# Patient Record
Sex: FEMALE | Race: WHITE | URBAN - METROPOLITAN AREA
[De-identification: names, ages, dates, MRNs, and addresses within clinical notes are randomized per-mention and may not be internally consistent; named-entity substitution may affect disease eponyms.]

---

## 2023-05-15 ENCOUNTER — TELEPHONE (OUTPATIENT)
Dept: HEMATOLOGY/ONCOLOGY | Facility: HOSPITAL | Age: 83
End: 2023-05-15

## 2023-06-07 ENCOUNTER — APPOINTMENT (OUTPATIENT)
Dept: GENERAL RADIOLOGY | Facility: HOSPITAL | Age: 83
End: 2023-06-07
Attending: EMERGENCY MEDICINE
Payer: MEDICARE

## 2023-06-07 ENCOUNTER — HOSPITAL ENCOUNTER (EMERGENCY)
Facility: HOSPITAL | Age: 83
Discharge: HOME | End: 2023-06-08
Attending: EMERGENCY MEDICINE
Payer: MEDICARE

## 2023-06-07 DIAGNOSIS — R41.0 CONFUSION: Primary | ICD-10-CM

## 2023-06-07 LAB
ALBUMIN SERPL-MCNC: 3.3 G/DL (ref 3.4–5)
ALBUMIN/GLOB SERPL: 0.7 {RATIO} (ref 1–2)
ALP LIVER SERPL-CCNC: 52 U/L
ALT SERPL-CCNC: 18 U/L
ANION GAP SERPL CALC-SCNC: 5 MMOL/L (ref 0–18)
AST SERPL-CCNC: 51 U/L (ref 15–37)
BASOPHILS # BLD AUTO: 0.06 X10(3) UL (ref 0–0.2)
BASOPHILS NFR BLD AUTO: 0.8 %
BILIRUB SERPL-MCNC: 0.9 MG/DL (ref 0.1–2)
BUN BLD-MCNC: 13 MG/DL (ref 7–18)
CALCIUM BLD-MCNC: 10 MG/DL (ref 8.5–10.1)
CHLORIDE SERPL-SCNC: 105 MMOL/L (ref 98–112)
CO2 SERPL-SCNC: 25 MMOL/L (ref 21–32)
CREAT BLD-MCNC: 0.88 MG/DL
EOSINOPHIL # BLD AUTO: 0.41 X10(3) UL (ref 0–0.7)
EOSINOPHIL NFR BLD AUTO: 5.6 %
ERYTHROCYTE [DISTWIDTH] IN BLOOD BY AUTOMATED COUNT: 13.2 %
GFR SERPLBLD BASED ON 1.73 SQ M-ARVRAT: 65 ML/MIN/1.73M2 (ref 60–?)
GLOBULIN PLAS-MCNC: 4.5 G/DL (ref 2.8–4.4)
GLUCOSE BLD-MCNC: 131 MG/DL (ref 70–99)
HCT VFR BLD AUTO: 41.4 %
HGB BLD-MCNC: 13.7 G/DL
IMM GRANULOCYTES # BLD AUTO: 0.03 X10(3) UL (ref 0–1)
IMM GRANULOCYTES NFR BLD: 0.4 %
LACTATE SERPL-SCNC: 1.8 MMOL/L (ref 0.4–2)
LYMPHOCYTES # BLD AUTO: 2.09 X10(3) UL (ref 1–4)
LYMPHOCYTES NFR BLD AUTO: 28.3 %
MCH RBC QN AUTO: 28.6 PG (ref 26–34)
MCHC RBC AUTO-ENTMCNC: 33.1 G/DL (ref 31–37)
MCV RBC AUTO: 86.4 FL
MONOCYTES # BLD AUTO: 0.94 X10(3) UL (ref 0.1–1)
MONOCYTES NFR BLD AUTO: 12.7 %
NEUTROPHILS # BLD AUTO: 3.85 X10 (3) UL (ref 1.5–7.7)
NEUTROPHILS # BLD AUTO: 3.85 X10(3) UL (ref 1.5–7.7)
NEUTROPHILS NFR BLD AUTO: 52.2 %
OSMOLALITY SERPL CALC.SUM OF ELEC: 282 MOSM/KG (ref 275–295)
PHOSPHATE SERPL-MCNC: 2.3 MG/DL (ref 2.5–4.9)
PLATELET # BLD AUTO: 224 10(3)UL (ref 150–450)
POTASSIUM SERPL-SCNC: 4.9 MMOL/L (ref 3.5–5.1)
PROT SERPL-MCNC: 7.8 G/DL (ref 6.4–8.2)
RBC # BLD AUTO: 4.79 X10(6)UL
SODIUM SERPL-SCNC: 135 MMOL/L (ref 136–145)
TROPONIN I HIGH SENSITIVITY: 10 NG/L
WBC # BLD AUTO: 7.4 X10(3) UL (ref 4–11)

## 2023-06-07 PROCEDURE — 71045 X-RAY EXAM CHEST 1 VIEW: CPT | Performed by: EMERGENCY MEDICINE

## 2023-06-07 PROCEDURE — 99285 EMERGENCY DEPT VISIT HI MDM: CPT

## 2023-06-07 PROCEDURE — 80053 COMPREHEN METABOLIC PANEL: CPT | Performed by: EMERGENCY MEDICINE

## 2023-06-07 PROCEDURE — 96360 HYDRATION IV INFUSION INIT: CPT

## 2023-06-07 PROCEDURE — 83605 ASSAY OF LACTIC ACID: CPT | Performed by: EMERGENCY MEDICINE

## 2023-06-07 PROCEDURE — 80053 COMPREHEN METABOLIC PANEL: CPT

## 2023-06-07 PROCEDURE — 84100 ASSAY OF PHOSPHORUS: CPT | Performed by: EMERGENCY MEDICINE

## 2023-06-07 PROCEDURE — 84484 ASSAY OF TROPONIN QUANT: CPT | Performed by: EMERGENCY MEDICINE

## 2023-06-07 PROCEDURE — 93005 ELECTROCARDIOGRAM TRACING: CPT

## 2023-06-07 PROCEDURE — 84100 ASSAY OF PHOSPHORUS: CPT

## 2023-06-07 PROCEDURE — 85025 COMPLETE CBC W/AUTO DIFF WBC: CPT

## 2023-06-07 PROCEDURE — 85025 COMPLETE CBC W/AUTO DIFF WBC: CPT | Performed by: EMERGENCY MEDICINE

## 2023-06-07 PROCEDURE — 93010 ELECTROCARDIOGRAM REPORT: CPT

## 2023-06-07 PROCEDURE — 96361 HYDRATE IV INFUSION ADD-ON: CPT

## 2023-06-07 RX ORDER — BUSPIRONE HYDROCHLORIDE 15 MG/1
15 TABLET ORAL 2 TIMES DAILY
COMMUNITY

## 2023-06-07 RX ORDER — LEVOTHYROXINE SODIUM 0.07 MG/1
75 TABLET ORAL
COMMUNITY

## 2023-06-07 RX ORDER — SODIUM CHLORIDE 9 MG/ML
125 INJECTION, SOLUTION INTRAVENOUS CONTINUOUS
Status: DISCONTINUED | OUTPATIENT
Start: 2023-06-07 | End: 2023-06-08

## 2023-06-07 RX ORDER — PAROXETINE 30 MG/1
30 TABLET, FILM COATED ORAL EVERY MORNING
COMMUNITY

## 2023-06-07 RX ORDER — ANASTROZOLE 1 MG/1
1 TABLET ORAL DAILY
COMMUNITY

## 2023-06-08 ENCOUNTER — APPOINTMENT (OUTPATIENT)
Dept: CT IMAGING | Facility: HOSPITAL | Age: 83
End: 2023-06-08
Attending: EMERGENCY MEDICINE
Payer: MEDICARE

## 2023-06-08 VITALS
SYSTOLIC BLOOD PRESSURE: 148 MMHG | RESPIRATION RATE: 20 BRPM | OXYGEN SATURATION: 98 % | HEART RATE: 95 BPM | DIASTOLIC BLOOD PRESSURE: 87 MMHG | TEMPERATURE: 98 F

## 2023-06-08 LAB
ATRIAL RATE: 85 BPM
BILIRUB UR QL STRIP.AUTO: NEGATIVE
COLOR UR AUTO: YELLOW
GLUCOSE UR STRIP.AUTO-MCNC: NEGATIVE MG/DL
HYALINE CASTS #/AREA URNS AUTO: PRESENT /LPF
NITRITE UR QL STRIP.AUTO: NEGATIVE
P AXIS: 71 DEGREES
P-R INTERVAL: 170 MS
PH UR STRIP.AUTO: 6 [PH] (ref 5–8)
PROT UR STRIP.AUTO-MCNC: NEGATIVE MG/DL
Q-T INTERVAL: 364 MS
QRS DURATION: 86 MS
QTC CALCULATION (BEZET): 433 MS
R AXIS: -40 DEGREES
RBC UR QL AUTO: NEGATIVE
SP GR UR STRIP.AUTO: 1.02 (ref 1–1.03)
T AXIS: 78 DEGREES
UROBILINOGEN UR STRIP.AUTO-MCNC: 2 MG/DL
VENTRICULAR RATE: 85 BPM

## 2023-06-08 PROCEDURE — 87186 SC STD MICRODIL/AGAR DIL: CPT | Performed by: EMERGENCY MEDICINE

## 2023-06-08 PROCEDURE — 81001 URINALYSIS AUTO W/SCOPE: CPT | Performed by: EMERGENCY MEDICINE

## 2023-06-08 PROCEDURE — 70450 CT HEAD/BRAIN W/O DYE: CPT | Performed by: EMERGENCY MEDICINE

## 2023-06-08 PROCEDURE — 87086 URINE CULTURE/COLONY COUNT: CPT | Performed by: EMERGENCY MEDICINE

## 2023-06-08 PROCEDURE — 87077 CULTURE AEROBIC IDENTIFY: CPT | Performed by: EMERGENCY MEDICINE

## 2023-06-08 NOTE — ED QUICK NOTES
Assumed care of pt from [facility]. Pt resting on cart, respirations even/NL. Denies needs at this time, call light within reach.

## 2023-06-10 ENCOUNTER — TELEPHONE (OUTPATIENT)
Dept: CASE MANAGEMENT | Facility: HOSPITAL | Age: 83
End: 2023-06-10

## 2023-06-10 RX ORDER — AMOXICILLIN 875 MG/1
875 TABLET, COATED ORAL 2 TIMES DAILY
Qty: 10 TABLET | Refills: 0 | Status: SHIPPED | OUTPATIENT
Start: 2023-06-10 | End: 2023-06-15

## 2023-06-10 NOTE — CM/SW NOTE
Received a call from pt's son Cory krause. They saw the positive urine culture and mother is confused. They would like antibiotic prescribed. Spoke to Payton in pharmacy. Med needs to be called into [facility] at [address] [facility] [facility] [address]  Phone in [phone] as they are open until 15 [place]    Once prescription is filled call dtr. Minoo Lara at [phone].

## 2023-06-10 NOTE — PROGRESS NOTES
New rx for amoxicillin sent to [facility] [address] in [place]. Patient informed and educated regarding change to therapy.

## 2023-06-14 ENCOUNTER — APPOINTMENT (OUTPATIENT)
Dept: HEMATOLOGY/ONCOLOGY | Facility: HOSPITAL | Age: 83
End: 2023-06-14
Attending: INTERNAL MEDICINE

## 2023-06-15 ENCOUNTER — HOSPITAL ENCOUNTER (EMERGENCY)
Facility: HOSPITAL | Age: 83
Discharge: NURSING, REHAB OR LONG-TERM CARE | End: 2023-06-16
Attending: STUDENT IN AN ORGANIZED HEALTH CARE EDUCATION/TRAINING PROGRAM
Payer: MEDICARE

## 2023-06-15 DIAGNOSIS — N30.00 ACUTE CYSTITIS WITHOUT HEMATURIA: Primary | ICD-10-CM

## 2023-06-15 DIAGNOSIS — F03.93 DEMENTIA WITH MOOD DISTURBANCE, UNSPECIFIED DEMENTIA SEVERITY, UNSPECIFIED DEMENTIA TYPE (HCC): ICD-10-CM

## 2023-06-15 LAB
BILIRUB UR QL STRIP.AUTO: NEGATIVE
COLOR UR AUTO: YELLOW
GLUCOSE UR STRIP.AUTO-MCNC: NEGATIVE MG/DL
HYALINE CASTS #/AREA URNS AUTO: PRESENT /LPF
NITRITE UR QL STRIP.AUTO: NEGATIVE
PH UR STRIP.AUTO: 8 [PH] (ref 5–8)
PROT UR STRIP.AUTO-MCNC: NEGATIVE MG/DL
RBC #/AREA URNS AUTO: >10 /HPF
SARS-COV-2 RNA RESP QL NAA+PROBE: NOT DETECTED
SP GR UR STRIP.AUTO: 1.02 (ref 1–1.03)
UROBILINOGEN UR STRIP.AUTO-MCNC: 2 MG/DL

## 2023-06-15 RX ORDER — MULTIVIT-MIN/IRON FUM/FOLIC AC 7.5 MG-4
1 TABLET ORAL DAILY
COMMUNITY

## 2023-06-16 ENCOUNTER — APPOINTMENT (OUTPATIENT)
Dept: GENERAL RADIOLOGY | Facility: HOSPITAL | Age: 83
End: 2023-06-16
Attending: STUDENT IN AN ORGANIZED HEALTH CARE EDUCATION/TRAINING PROGRAM
Payer: MEDICARE

## 2023-06-16 VITALS
BODY MASS INDEX: 24.45 KG/M2 | DIASTOLIC BLOOD PRESSURE: 81 MMHG | HEART RATE: 89 BPM | TEMPERATURE: 98 F | WEIGHT: 145 LBS | SYSTOLIC BLOOD PRESSURE: 147 MMHG | RESPIRATION RATE: 18 BRPM | OXYGEN SATURATION: 96 % | HEIGHT: 64.57 IN

## 2023-06-16 LAB
ALBUMIN SERPL-MCNC: 3.4 G/DL (ref 3.4–5)
ALBUMIN/GLOB SERPL: 0.7 {RATIO} (ref 1–2)
ALP LIVER SERPL-CCNC: 57 U/L
ALT SERPL-CCNC: 29 U/L
AMPHET UR QL SCN: NEGATIVE
ANION GAP SERPL CALC-SCNC: 7 MMOL/L (ref 0–18)
APAP SERPL-MCNC: <2 UG/ML (ref 10–30)
AST SERPL-CCNC: 40 U/L (ref 15–37)
BENZODIAZ UR QL SCN: NEGATIVE
BILIRUB SERPL-MCNC: 0.7 MG/DL (ref 0.1–2)
BUN BLD-MCNC: 13 MG/DL (ref 7–18)
CALCIUM BLD-MCNC: 9.6 MG/DL (ref 8.5–10.1)
CANNABINOIDS UR QL SCN: NEGATIVE
CHLORIDE SERPL-SCNC: 102 MMOL/L (ref 98–112)
CO2 SERPL-SCNC: 26 MMOL/L (ref 21–32)
COCAINE UR QL: NEGATIVE
CREAT BLD-MCNC: 0.94 MG/DL
CREAT UR-SCNC: 43.1 MG/DL
ETHANOL SERPL-MCNC: <3 MG/DL (ref ?–3)
GFR SERPLBLD BASED ON 1.73 SQ M-ARVRAT: 60 ML/MIN/1.73M2 (ref 60–?)
GLOBULIN PLAS-MCNC: 5.1 G/DL (ref 2.8–4.4)
GLUCOSE BLD-MCNC: 116 MG/DL (ref 70–99)
MDMA UR QL SCN: NEGATIVE
OPIATES UR QL SCN: NEGATIVE
OSMOLALITY SERPL CALC.SUM OF ELEC: 281 MOSM/KG (ref 275–295)
OXYCODONE UR QL SCN: NEGATIVE
POTASSIUM SERPL-SCNC: 4 MMOL/L (ref 3.5–5.1)
PROT SERPL-MCNC: 8.5 G/DL (ref 6.4–8.2)
SALICYLATES SERPL-MCNC: <1.7 MG/DL (ref 2.8–20)
SODIUM SERPL-SCNC: 135 MMOL/L (ref 136–145)

## 2023-06-16 PROCEDURE — 71046 X-RAY EXAM CHEST 2 VIEWS: CPT | Performed by: STUDENT IN AN ORGANIZED HEALTH CARE EDUCATION/TRAINING PROGRAM

## 2023-06-16 PROCEDURE — 90792 PSYCH DIAG EVAL W/MED SRVCS: CPT

## 2023-06-16 RX ORDER — CEPHALEXIN 500 MG/1
500 CAPSULE ORAL ONCE
Status: COMPLETED | OUTPATIENT
Start: 2023-06-16 | End: 2023-06-16

## 2023-06-16 RX ORDER — DIPHENHYDRAMINE HYDROCHLORIDE 50 MG/ML
25 INJECTION INTRAMUSCULAR; INTRAVENOUS ONCE
Status: COMPLETED | OUTPATIENT
Start: 2023-06-16 | End: 2023-06-16

## 2023-06-16 RX ORDER — ANASTROZOLE 1 MG/1
1 TABLET ORAL DAILY
Status: DISCONTINUED | OUTPATIENT
Start: 2023-06-16 | End: 2023-06-16

## 2023-06-16 RX ORDER — CEPHALEXIN 500 MG/1
500 CAPSULE ORAL 3 TIMES DAILY
Qty: 21 CAPSULE | Refills: 0 | Status: SHIPPED | OUTPATIENT
Start: 2023-06-16 | End: 2023-06-23

## 2023-06-16 RX ORDER — BUSPIRONE HYDROCHLORIDE 10 MG/1
15 TABLET ORAL 2 TIMES DAILY
Status: DISCONTINUED | OUTPATIENT
Start: 2023-06-16 | End: 2023-06-16

## 2023-06-16 RX ORDER — HALOPERIDOL 5 MG/ML
2 INJECTION INTRAMUSCULAR ONCE
Status: COMPLETED | OUTPATIENT
Start: 2023-06-16 | End: 2023-06-16

## 2023-06-16 NOTE — ED NOTES
Delayed Note:    [address] and left a voice mail message    [facility] [place] Baldomero at [phone] to make referral to [facility]. Was unable to reach a staff member. Lukas Lind    Called [facility] and spoke with Gisel Nam who confirmed that there were two female beds. Gisel Nam suggested to call Gosia Corona at [phone]. Called Gosia Corona at [phone] and referral was made over the phone. Referral Packet was faxed for review.

## 2023-06-16 NOTE — ED QUICK NOTES
Report given to [address] [place] [address] from [facility]. They will set up transport to take pt after 1900.

## 2023-06-16 NOTE — ED QUICK NOTES
Rounding Completed    Plan of Care reviewed. Waiting for placement. Elimination needs assessed. Bed is locked and in lowest position. Call light within reach. Sitter at bedside.

## 2023-06-16 NOTE — ED PROVIDER NOTES
The patient was observed in the department and remained calm and cooperative during their stay. We are awaiting psychiatric placement at this time. The patient's been medically cleared at this time for psychiatric transfer.

## 2023-06-16 NOTE — ED QUICK NOTES
Pt got out of bed, pulled off depends. Pt redirected to get in bed. Pt's linen was changed and purewick reapplied.

## 2023-06-16 NOTE — ED NOTES
Received a call back from Tiffany at [facility]. Referral Packet faxed for review as well.

## 2023-06-16 NOTE — ED NOTES
Delayed Note:    Spoke with Kasie Harmon to inform her that [facility] were reviewing for placement. Kasie Harmon stated that she was looking for her son, Ced Blancas. Informed Priscilla that we will contact Ced Blancas to inform him on plan of care as well. Kasie Harmon provided permission to contact her son, who is also her POA. Called Ced Avilezaron, Priscilla's POA, to provide update on plan of care. Informed Ced Blancas that there are currently no beds at [facility]. Informed Ced Avilezaron that [facility] are reviewing for placement. Provided Ced Blancas the phone number to RN station so that he is able to speak with Kasie Harmon and to ask medication questions. Informed Ced Blancas that he will be provided with placement status from both hospitals. Received a call from Chad Cisse (Intake at [facility] who stated that [facility] is reviewing for placement. Chad Cisse did not receive the Petition from [facility] Re-faxed [facility] so that it could be forwarded to [facility].

## 2023-06-16 NOTE — ED INITIAL ASSESSMENT (HPI)
Pt's son brought pt for increased confusion and verbally aggressive. Pt lives at independent living and pt's son states concern that the pt is not safe where she lives and made a threat to harm herself tonight. Pt's son reports the pt stated \" If I had a gun I would shoot myself. I don't want to be here anymore\". Pt was seen in the ER last week and was given abx for UTI.

## 2023-06-16 NOTE — ED QUICK NOTES
Pt ambulated to bathroom with steady gait with this RN and sitter. Pt urinated successfully. 400 mL output.

## 2023-06-16 NOTE — BH LEVEL OF CARE ASSESSMENT
Crisis Evaluation Assessment    Bao Alvarez YOB: 1940   Age 79year old MRN [id number]   Location [facility] Attending Salma Woods MD      Patient's legal sex: female  Patient identifies as: female  Patient's birth sex: female  Preferred pronouns: She/Her    Date of Service: 6/16/2023    Referral Source:  Referral Source  Referral Source: Friend/Relative  Referral Source Info: Pt family brought pt in for mental health assessment     Reason for Crisis Evaluation   Pt family reports pt has been having increased agitation, confusion, verbal outburst, delusional thoughts, and paranoia. Collateral  Pt family reports pt has been experiencing increased agitation and verbal aggression. Pt family reports pt just moved to [place] 2 weeks ago and they are seeing the behavioral health concerns in person now and feel pt is not safe. Pt family report they originally got pt into independent living however learned very quickly that she should be in assisted living. Patient family reports pt has been calling them multiple times overnight with paranoid and delusional thoughts. Pt family reports pt is fine mid-day, but overnight she 'sun-downs' and becomes increasingly worse. Pt family reports they are concerned for pt's safety. Pt family report pt has dementia and is confused often. Pt family report pt made a suicidal statement today, reporting if she had a gun she would shoot herself. Pt family reports there are no guns in pt's possession. Pt family report when pt was living with another relative in another state, that relative informed them of pt's behavioral health however pt family didn't realize it was a bad as it was until she moved to [place]. Pt family report pt is becoming quickly aggressive towards them and will push them away.      Risk to Self or Others  Psychosis: Pt family report pt experiences delusional thoughts overnight and calls them multiple times making distress calls, requiring reassurance she is fine. Aggression/Agitation: Pt family report increased agitation and escalating verbal aggression. Destruction of property: Pt family denies  HI: Pt family denies    Suicide Risk Assessments:    Source of information for CSSR: Patient  In what setting is the screener performed?: in person  1. Have you wished you were dead or wished you could go to sleep and not wake up? (past 30 days): Yes  2. Have you actually had any thoughts of killing yourself? (past 30 days): Yes  3. Have you been thinking about how you might kill yourself? (past 30 days): Yes  4. Have you had these thoughts and had some intention of acting on them? (past 30 days): No  5a. Have you started to work out or worked out the details of how to kill yourself? (past 30 days): No  5b. Do you intend to carry out this plan? (past 30 days): No  6. Have you ever done anything, started to do anything, or prepared to do anything to end your life? (lifetime): No     Score - BH OV: 4 - Medium Risk   Describe : Pt family report pt making SI statements, today she said she wanted to shoot herself with a gun  Is your experience of thoughts of dying by suicide: Frightening  Protective Factors: Family  Past Suicidal Ideation: Ideation  Describe: Pt making suicidal statements, no methods until today         Family History or Personal Lived Experience of Loss or Near Loss by Suicide: Denies          Helpless/Hopeless/Worthlessness: Pt family reports when pt comes out of behavioral outburst, she apologizes and says that is not like her to do and is remorseful. Significant losses: Pt lost her  5 years ago. Stressors:  [place], in independent living, alone, social anxiety, HARVEY, general worry, dementia    Non-Suicidal Self-Injury:   Pt family denies    Access to Means:  Access to Means  Has access to means to attempt suicide or harm others or property: No  Access to Firearm/Weapon: No  Do you have a firearm owner ID card?: No    Protective Factors:   Protective Factors: Family    Review of Psychiatric Systems:  Anxiety: Pt family reports pt has been experiencing increased anxiety for 1 hour at a time, multiple times a day, to the point where she calls them overnight multiple times. Pt family report pt is also fidgety and very agitated. Depression: Pt family reports pt experiences outbursts of depression, recent SI statements. Pt family reports this behavior heightened after pt was told she wont be staying in [place]. Sleep: Pt family reports pt has trouble sleeping overnight and calls at 1 AM, 3 AM, and more times due to panic, confusion, seeming like manic phone calls, rests during day but is easily awoken. Pt family reports they do not think pt has had full nights rest in awhile. Appetite: Pt family reports appetite is minimal, up and down, depends on the meal    Substance Use:  Pt family denies    Functional Achievement:   Work:Pt family report pt is retired  Home: See james assessment for ADL's    Current Treatment and Treatment History:  Prior diagnoses: HARVEY in 2019, agoraphobia late 's, early 's. Probably MDD  Therapist: 2x only, Annemarie  Psychiatrist: Need to see one, no visits available until July  Medications: Paroxetine 30 mg, Buspirone 15 mg 2x daily, levothyroxine 75 mcg 2x daily, anastrozole 1 mg  No IP/OP hx    Relevant Social History:    11 years ago, 3 children, 2 are POA's (1 financial, 1 medical)  Independent living, family wants assisted now. [place] with daughter from [place]  No legal hx  No abuse/trauma  HARVEY, OCD in family hx    James and Complex (as applicable):  Geriatric Functioning  Dementia Symptoms Observed/Expressed:  Yes  Aberrant Motor Activity: Other (comment) (Forgetting to put pants on at times)  Verbal Abuse to Others: Yes (Describe)  Potentially Dangerous Behaviors: Agitation;Combative with/refusing care  Noisy Vocalizations: None  Frequent Distress Calls: Yes (Describe)  Describe Frequent Distress Calls[de-identified] Overnight will call, but today began calling numerous times in distress  Responsiveness to Reassurance: Yes  Current Living Situation  Current Living Situation:  (Independent living facility)  [facility]  Is the patient verbal?: Yes  Vision or hearing correction?: Eye Glasses  Patient able to understand and follow directions?: Yes  Patient able to express needs?: Yes  Feeding and Swallowing  History of aspiration or choking?: No  Feeding assistance needed?: Requires set-up  Patient have any chewing or swallowing problems?: No  Special Diet: No  Mobility/Activity & Assistive Devices  Current/recent injuries or surgeries that affect mobility?: No  Physical Limitations Present: None  Independent in ambulation?: Yes  Transfer Assist: Standby Assist  Assistive Device Used[de-identified] Walker  History of falls?: No  Grooming  Level of independence in dressing: Minimal assist  Level of independence to shower/bathe/wash: Maximum assist  Level of independence in personal grooming tasks (e.g., brushing teeth, brushing hair, applying hearing aids, shaving, etc.): Requires set-up/cues  Toileting  Patient Incontinence: Bladder; Bowel (bowel: needs help with wiping)  Special Considerations  Patient has pressures sores, surgical wounds, bandages/dressings?: No  Patient uses any kind of pump?: No  Does the patient need a BiPAP or CPAP?: No  Intellectual disability reported?   Intellectual Disability?: No  Reported Social/Emotional Functioning Level  Describe: Age appropriate    EDP Assessment (as applicable):  IBW Calculations  Weight: 145 lb  BMI (Calculated): 24.5  IBW LBS [place]: 122.84 LBS  IBW %: 118.04 %  IBW + 10%: 135.12 LBS  IBW - 10%: 110.56 LBS    Abuse Assessment:  Abuse Assessment  Physical Abuse: Denies  Verbal Abuse: Denies  Sexual Abuse: Denies  Neglect: Denies  Does anyone say or do something to you that makes you feel unsafe?: No  Have You Ever Been Harmed by a Partner/Caregiver?: No  Health Concerns r/t Abuse: No  Possible Abuse Reportable to[de-identified] Not appropriate for reporting to authorities    Mental Status Exam:   General Appearance  Characteristics: Appropriate clothing;Good hygiene  Eye Contact: Direct  Psychomotor Behavior  Gait/Movement: Normal;Steady; Coordinated  Abnormal movements: None  Posture: Stiff  Rate of Movement: Normal  Mood and Affect  Mood or Feelings: Irritability; Anxious; Confused;Stressed  Anxiety Level- MARIANELA only: Moderate  Appropriateness of Affect: Congruent to mood; Appropriate to situation  Range of Affect: Normal  Stability of Affect: Stable  Attitude toward staff: Co-operative; Suspicious  Speech  Rate of Speech: Appropriate  Flow of Speech: Appropriate  Intensity of Volume: Ordinary  Clarity: Clear  Cognition  Concentration: Unimpaired  Memory: Recent memory intact; Remote memory intact  Orientation Level: Oriented X4;Appropriate for developmental age  Insight: Poor  Fair/poor insight as evidenced by: Pt experiencing increased behavioral aggressive and delusional outburst  Judgment: Poor  Fair/poor judgment as evidenced by: Pt experiencing increased behavioral aggressive and delusional outburst  Thought Patterns  Clarity/Relevance: Coherent;Logical;Relevant to topic  Flow: Organized  Content: Delusions; Loose association  Level of Consciousness: Alert  Level of Consciousness: Alert  Behavior  Exhibited behavior: Participated      Disposition:  Consulted with Dr. Hilary Reese, pt's ED physician. Agreement for inpatient hospitalization for pt due to inability to care for self. Assessment Summary:   Pt is an 71-year-old female presenting with increased agitation, increased behavioral aggressive and delusional outburst.  Pt expressed SI, denies plan or intent. Pt denies HI/AVH/SIB. Pt has 2 POA's, 1 for financial and 1 for medical, both papers scanned into pt's chart.   Pt family reports pt has been experiencing increased agitation and verbal aggression. Pt family reports pt just moved to [place] 2 weeks ago and they are seeing the behavioral health concerns in person now and feel pt is not safe. Pt family report they originally got pt into independent living however learned very quickly that she should be in assisted living. Patient family reports pt has been calling them multiple times overnight with paranoid and delusional thoughts. Pt family reports pt is fine mid-day, but overnight she 'sun-downs' and becomes increasingly worse. Pt family reports they are concerned for pt's safety. Pt family report pt has dementia and is confused often. Pt family report pt made a suicidal statement today, reporting if she had a gun she would shoot herself. Pt family reports there are no guns in pt's possession. Pt family report when pt was living with another relative in another state, that relative informed them of pt's behavioral health however pt family didn't realize it was a bad as it was until she moved to [place]. Pt family report pt is becoming quickly aggressive towards them and will push them away. Pt not sleeping or eating well. Risk/Protective Factors  Protective Factors: Family    Level of Care Recommendations  Consulted with: Dr. Walker Ayala  Level of Care Recommendation: Inpatient Acute Care  Unit: [facility]/Gener[facility]  Reason for Unit Assigned: Pt experiencing increased behavioral aggressive and delusional outburst  Inpatient Criteria: Inability to care for self; Failure at lowest level of care  Behavioral Precautions: Close Observation  Medical Precautions: None  Refused Treatment: No  Education Provided: Call 911 in an Emergency; Advised to call with questions;[facility] Crisis Line Number;Advised to call if condition worsens  Transferred: No  Sign-In  Paperwork Signed: Patient Rights;Voluntary Admission Form  Inpatient Admission Type: Adult Voluntary Signed  Patient Provided: Rights of Individuals Receiving MH/DD Services  Patient Verbalized Understanding: Yes        Diagnoses:  Primary Psychiatric Diagnosis  F03. 91 Unspecified dementia, unspecified severity, with behavioral disturbance     Secondary Psychiatric Diagnoses  To be determined   Pervasive Diagnoses  Deferred  Pertinent Non-Psychiatric Diagnoses  Deferred        Sonia PERAZA LPC

## 2023-06-30 ENCOUNTER — TELEPHONE (OUTPATIENT)
Dept: HEMATOLOGY/ONCOLOGY | Facility: HOSPITAL | Age: 83
End: 2023-06-30

## 2023-07-12 ENCOUNTER — APPOINTMENT (OUTPATIENT)
Dept: INTERVENTIONAL RADIOLOGY/VASCULAR | Facility: HOSPITAL | Age: 83
End: 2023-07-12
Attending: NEUROLOGICAL SURGERY
Payer: MEDICARE

## 2023-07-12 ENCOUNTER — APPOINTMENT (OUTPATIENT)
Dept: CT IMAGING | Facility: HOSPITAL | Age: 83
End: 2023-07-12
Payer: MEDICARE

## 2023-07-12 ENCOUNTER — APPOINTMENT (OUTPATIENT)
Dept: GENERAL RADIOLOGY | Facility: HOSPITAL | Age: 83
End: 2023-07-12
Payer: MEDICARE

## 2023-07-12 ENCOUNTER — APPOINTMENT (OUTPATIENT)
Dept: CT IMAGING | Facility: HOSPITAL | Age: 83
End: 2023-07-12
Attending: INTERNAL MEDICINE
Payer: MEDICARE

## 2023-07-12 ENCOUNTER — HOSPITAL ENCOUNTER (INPATIENT)
Facility: HOSPITAL | Age: 83
LOS: 6 days | Discharge: NURSING, REHAB OR LONG-TERM CARE | End: 2023-07-18
Attending: EMERGENCY MEDICINE | Admitting: INTERNAL MEDICINE
Payer: MEDICARE

## 2023-07-12 DIAGNOSIS — Z79.01 CURRENT USE OF LONG TERM ANTICOAGULATION: ICD-10-CM

## 2023-07-12 DIAGNOSIS — I62.9 INTRACRANIAL HEMORRHAGE (HCC): Primary | ICD-10-CM

## 2023-07-12 PROBLEM — I61.9 NONTRAUMATIC THALAMIC HEMORRHAGE (HCC): Status: ACTIVE | Noted: 2023-07-12

## 2023-07-12 LAB
ALBUMIN SERPL-MCNC: 3.4 G/DL (ref 3.4–5)
ALBUMIN/GLOB SERPL: 0.8 {RATIO} (ref 1–2)
ALP LIVER SERPL-CCNC: 58 U/L
ALT SERPL-CCNC: 23 U/L
ANION GAP SERPL CALC-SCNC: 3 MMOL/L (ref 0–18)
APTT PPP: 37 SECONDS (ref 23.3–35.6)
AST SERPL-CCNC: 30 U/L (ref 15–37)
ATRIAL RATE: 153 BPM
ATRIAL RATE: 69 BPM
BASOPHILS # BLD AUTO: 0.08 X10(3) UL (ref 0–0.2)
BASOPHILS NFR BLD AUTO: 0.8 %
BILIRUB SERPL-MCNC: 0.7 MG/DL (ref 0.1–2)
BUN BLD-MCNC: 15 MG/DL (ref 7–18)
CALCIUM BLD-MCNC: 9.2 MG/DL (ref 8.5–10.1)
CHLORIDE SERPL-SCNC: 106 MMOL/L (ref 98–112)
CO2 SERPL-SCNC: 30 MMOL/L (ref 21–32)
CREAT BLD-MCNC: 0.78 MG/DL
EOSINOPHIL # BLD AUTO: 0.17 X10(3) UL (ref 0–0.7)
EOSINOPHIL NFR BLD AUTO: 1.6 %
ERYTHROCYTE [DISTWIDTH] IN BLOOD BY AUTOMATED COUNT: 15.5 %
GFR SERPLBLD BASED ON 1.73 SQ M-ARVRAT: 75 ML/MIN/1.73M2 (ref 60–?)
GLOBULIN PLAS-MCNC: 4.4 G/DL (ref 2.8–4.4)
GLUCOSE BLD-MCNC: 105 MG/DL (ref 70–99)
GLUCOSE BLD-MCNC: 109 MG/DL (ref 70–99)
GLUCOSE BLD-MCNC: 136 MG/DL (ref 70–99)
GLUCOSE BLD-MCNC: 139 MG/DL (ref 70–99)
HCT VFR BLD AUTO: 44.8 %
HGB BLD-MCNC: 14.5 G/DL
IMM GRANULOCYTES # BLD AUTO: 0.06 X10(3) UL (ref 0–1)
IMM GRANULOCYTES NFR BLD: 0.6 %
INR BLD: 1.23 (ref 0.85–1.16)
LYMPHOCYTES # BLD AUTO: 2.53 X10(3) UL (ref 1–4)
LYMPHOCYTES NFR BLD AUTO: 23.7 %
MCH RBC QN AUTO: 28.3 PG (ref 26–34)
MCHC RBC AUTO-ENTMCNC: 32.4 G/DL (ref 31–37)
MCV RBC AUTO: 87.3 FL
MONOCYTES # BLD AUTO: 0.75 X10(3) UL (ref 0.1–1)
MONOCYTES NFR BLD AUTO: 7 %
NEUTROPHILS # BLD AUTO: 7.07 X10 (3) UL (ref 1.5–7.7)
NEUTROPHILS # BLD AUTO: 7.07 X10(3) UL (ref 1.5–7.7)
NEUTROPHILS NFR BLD AUTO: 66.3 %
OSMOLALITY SERPL CALC.SUM OF ELEC: 291 MOSM/KG (ref 275–295)
P AXIS: 84 DEGREES
P-R INTERVAL: 158 MS
PLATELET # BLD AUTO: 249 10(3)UL (ref 150–450)
POTASSIUM SERPL-SCNC: 3.7 MMOL/L (ref 3.5–5.1)
PROT SERPL-MCNC: 7.8 G/DL (ref 6.4–8.2)
PROTHROMBIN TIME: 15.5 SECONDS (ref 11.6–14.8)
Q-T INTERVAL: 306 MS
Q-T INTERVAL: 442 MS
QRS DURATION: 88 MS
QRS DURATION: 88 MS
QTC CALCULATION (BEZET): 473 MS
QTC CALCULATION (BEZET): 476 MS
R AXIS: -43 DEGREES
R AXIS: -51 DEGREES
RBC # BLD AUTO: 5.13 X10(6)UL
SARS-COV-2 RNA RESP QL NAA+PROBE: NOT DETECTED
SODIUM SERPL-SCNC: 139 MMOL/L (ref 136–145)
T AXIS: 79 DEGREES
T AXIS: 85 DEGREES
TROPONIN I HIGH SENSITIVITY: 9 NG/L
VENTRICULAR RATE: 146 BPM
VENTRICULAR RATE: 69 BPM
WBC # BLD AUTO: 10.7 X10(3) UL (ref 4–11)

## 2023-07-12 PROCEDURE — 70450 CT HEAD/BRAIN W/O DYE: CPT | Performed by: INTERNAL MEDICINE

## 2023-07-12 PROCEDURE — 71045 X-RAY EXAM CHEST 1 VIEW: CPT

## 2023-07-12 PROCEDURE — 70496 CT ANGIOGRAPHY HEAD: CPT

## 2023-07-12 PROCEDURE — 70450 CT HEAD/BRAIN W/O DYE: CPT

## 2023-07-12 PROCEDURE — 70498 CT ANGIOGRAPHY NECK: CPT

## 2023-07-12 PROCEDURE — XW03372 INTRODUCTION OF INACTIVATED COAGULATION FACTOR XA INTO PERIPHERAL VEIN, PERCUTANEOUS APPROACH, NEW TECHNOLOGY GROUP 2: ICD-10-PCS | Performed by: EMERGENCY MEDICINE

## 2023-07-12 PROCEDURE — 99292 CRITICAL CARE ADDL 30 MIN: CPT | Performed by: INTERNAL MEDICINE

## 2023-07-12 PROCEDURE — 99291 CRITICAL CARE FIRST HOUR: CPT | Performed by: INTERNAL MEDICINE

## 2023-07-12 PROCEDURE — 99291 CRITICAL CARE FIRST HOUR: CPT | Performed by: NEUROLOGICAL SURGERY

## 2023-07-12 RX ORDER — METOCLOPRAMIDE HYDROCHLORIDE 5 MG/ML
10 INJECTION INTRAMUSCULAR; INTRAVENOUS EVERY 8 HOURS PRN
Status: DISCONTINUED | OUTPATIENT
Start: 2023-07-12 | End: 2023-07-18

## 2023-07-12 RX ORDER — MEMANTINE HYDROCHLORIDE 10 MG/1
10 TABLET ORAL 2 TIMES DAILY
COMMUNITY

## 2023-07-12 RX ORDER — LEVOTHYROXINE SODIUM 88 UG/1
88 TABLET ORAL
Status: CANCELLED | OUTPATIENT
Start: 2023-07-12

## 2023-07-12 RX ORDER — ONDANSETRON 2 MG/ML
4 INJECTION INTRAMUSCULAR; INTRAVENOUS EVERY 6 HOURS PRN
Status: DISCONTINUED | OUTPATIENT
Start: 2023-07-12 | End: 2023-07-15

## 2023-07-12 RX ORDER — HEPARIN SODIUM 5000 [USP'U]/ML
INJECTION, SOLUTION INTRAVENOUS; SUBCUTANEOUS
Status: DISCONTINUED
Start: 2023-07-12 | End: 2023-07-12 | Stop reason: WASHOUT

## 2023-07-12 RX ORDER — BUSPIRONE HYDROCHLORIDE 5 MG/1
20 TABLET ORAL 2 TIMES DAILY
Status: DISCONTINUED | OUTPATIENT
Start: 2023-07-13 | End: 2023-07-18

## 2023-07-12 RX ORDER — ACETAMINOPHEN 325 MG/1
650 TABLET ORAL EVERY 4 HOURS PRN
Status: DISCONTINUED | OUTPATIENT
Start: 2023-07-12 | End: 2023-07-18

## 2023-07-12 RX ORDER — POTASSIUM CHLORIDE 14.9 MG/ML
20 INJECTION INTRAVENOUS ONCE
Status: COMPLETED | OUTPATIENT
Start: 2023-07-12 | End: 2023-07-12

## 2023-07-12 RX ORDER — LABETALOL HYDROCHLORIDE 5 MG/ML
10 INJECTION, SOLUTION INTRAVENOUS EVERY 10 MIN PRN
Status: COMPLETED | OUTPATIENT
Start: 2023-07-12 | End: 2023-07-15

## 2023-07-12 RX ORDER — ACETAMINOPHEN 650 MG/1
650 SUPPOSITORY RECTAL EVERY 4 HOURS PRN
Status: DISCONTINUED | OUTPATIENT
Start: 2023-07-12 | End: 2023-07-18

## 2023-07-12 RX ORDER — METOPROLOL TARTRATE 5 MG/5ML
5 INJECTION INTRAVENOUS ONCE
Status: COMPLETED | OUTPATIENT
Start: 2023-07-12 | End: 2023-07-12

## 2023-07-12 RX ORDER — MEMANTINE HYDROCHLORIDE 10 MG/1
10 TABLET ORAL 2 TIMES DAILY
Status: DISCONTINUED | OUTPATIENT
Start: 2023-07-12 | End: 2023-07-18

## 2023-07-12 RX ORDER — LIDOCAINE HYDROCHLORIDE 10 MG/ML
INJECTION, SOLUTION INFILTRATION; PERINEURAL
Status: DISCONTINUED
Start: 2023-07-12 | End: 2023-07-12 | Stop reason: WASHOUT

## 2023-07-12 RX ORDER — HYDRALAZINE HYDROCHLORIDE 20 MG/ML
10 INJECTION INTRAMUSCULAR; INTRAVENOUS EVERY 2 HOUR PRN
Status: DISCONTINUED | OUTPATIENT
Start: 2023-07-12 | End: 2023-07-18

## 2023-07-12 RX ORDER — SODIUM CHLORIDE 9 MG/ML
INJECTION, SOLUTION INTRAVENOUS CONTINUOUS
Status: DISCONTINUED | OUTPATIENT
Start: 2023-07-12 | End: 2023-07-14

## 2023-07-12 RX ORDER — LEVOTHYROXINE SODIUM 88 UG/1
88 TABLET ORAL
Status: DISCONTINUED | OUTPATIENT
Start: 2023-07-12 | End: 2023-07-18

## 2023-07-12 RX ORDER — VERAPAMIL HYDROCHLORIDE 2.5 MG/ML
INJECTION, SOLUTION INTRAVENOUS
Status: DISCONTINUED
Start: 2023-07-12 | End: 2023-07-12 | Stop reason: WASHOUT

## 2023-07-12 RX ORDER — POLYETHYLENE GLYCOL 3350 17 G/17G
17 POWDER, FOR SOLUTION ORAL EVERY OTHER DAY
COMMUNITY
End: 2023-07-18

## 2023-07-12 RX ORDER — ALPRAZOLAM 0.25 MG/1
0.25 TABLET ORAL EVERY 12 HOURS PRN
COMMUNITY
End: 2023-07-18

## 2023-07-12 RX ORDER — CHOLECALCIFEROL (VITAMIN D3) 125 MCG
1 CAPSULE ORAL NIGHTLY
COMMUNITY
End: 2023-07-18

## 2023-07-12 NOTE — ED QUICK NOTES
Per staff at [facility] ([facility]), pt took her eliquis 5 mg tab at 5pm yesterday. Per pharmacy, pt will now need low dose andexxa based on last dose. Discussed with Dr. Sulema Brewer.

## 2023-07-12 NOTE — CM/SW NOTE
Spoke with Adriana Holland, liaison for [facility]'' . Per Adriana Holland, patient admitted from [facility] for short term rehab on 6/22/2023. Patient has hx of dementia.   For PE, was on Eliquis for DOAC--received Andexxa--reversal.  Per adele, prior to [facility], patient resided independently @ [address] [place] [address]

## 2023-07-12 NOTE — ED PROVIDER NOTES
Patient Seen in: [facility] Emergency Department      History   Patient presents with:  Stroke    Stated Complaint: stroke    Subjective:   HPI    Patient is an 71-year-old female who presents from local nursing home rehab facility for evaluation of a possible stroke. Initially reported last normal time was 630, however after calling the rehab facility upon the patient's arrival, dayshift staff woke her up at 650 and noted the symptoms. Apparently last time she was seen with 6 AM but she was sleeping at that time. Thus her last normal time would be bedtime last night which current nursing staff on duty does not know. Patient is awake and alert and she is answering some questions appropriately although she has a history of dementia so history from her is quite limited. She denies any pain anywhere. Objective:   Past Medical History:   Diagnosis Date    Anxiety     Cancer ([address]     Thyroid disease               Past Surgical History:   Procedure Laterality Date    BREAST BIOPSY Right     LUMPECTOMY RIGHT                  Social History     Socioeconomic History    Marital status:    Tobacco Use    Smoking status: Never    Smokeless tobacco: Never   Vaping Use    Vaping Use: Never used   Substance and Sexual Activity    Alcohol use: Never    Drug use: Never              Review of Systems    Positive for stated complaint: stroke  Other systems are as noted in HPI. Constitutional and vital signs reviewed. All other systems reviewed and negative except as noted above. Physical Exam     ED Triage Vitals [07/12/23 0726]   /71   Pulse 77   Resp 15   Temp    Temp src    SpO2 96 %   O2 Device None (Room air)       Current:BP (!) 154/95   Pulse 77   Resp 20   SpO2 100%         Physical Exam  Vitals and nursing note reviewed. Constitutional:       Appearance: She is well-developed. HENT:      Head: Normocephalic and atraumatic.    Eyes:      Conjunctiva/sclera: Conjunctivae normal. Pupils: Pupils are equal, round, and reactive to light. Cardiovascular:      Rate and Rhythm: Normal rate and regular rhythm. Heart sounds: Normal heart sounds. Pulmonary:      Effort: Pulmonary effort is normal.      Breath sounds: Normal breath sounds. Abdominal:      General: Bowel sounds are normal.      Palpations: Abdomen is soft. Musculoskeletal:         General: Normal range of motion. Cervical back: Normal range of motion and neck supple. Skin:     General: Skin is warm and dry. Neurological:      Mental Status: She is alert and oriented to person, place, and time. Comments: Patient is awake and alert and answering questions although she is a limited historian. There is facial asymmetry with left-sided droop and asymmetric smile. Appears to have a right gaze preference although she will track to the left past midline. She has flaccid paralysis on the left with very minimal effort against gravity in the left arm. The left leg is slightly better but still minimal strength. NIH is 12 at a minimum. ED Course     Labs Reviewed   PROTHROMBIN TIME (PT) - Abnormal; Notable for the following components:       Result Value    PT 15.5 (*)     INR 1.23 (*)     All other components within normal limits   PTT, ACTIVATED - Abnormal; Notable for the following components:    PTT 37.0 (*)     All other components within normal limits   POCT GLUCOSE - Abnormal; Notable for the following components:    POC Glucose 105 (*)     All other components within normal limits   CBC WITH DIFFERENTIAL WITH PLATELET    Narrative: The following orders were created for panel order CBC With Differential With Platelet.   Procedure                               Abnormality         Status                     ---------                               -----------         ------                     CBC W/ DIFFERENTIAL[[id number]]                              Final result                 Please view results for these tests on the individual orders. COMP METABOLIC PANEL (14)   TROPONIN I HIGH SENSITIVITY   URINALYSIS WITH CULTURE REFLEX   RAINBOW DRAW LAVENDER   RAINBOW DRAW LIGHT GREEN   RAINBOW DRAW BLUE   RAINBOW DRAW GOLD   CBC W/ DIFFERENTIAL     EKG    Rate, intervals and axes as noted on EKG Report. Rate: 69  Rhythm: Sinus Rhythm  Reading: Sinus rhythm. 1 PVC. No old available for comparison. CT STROKE BRAIN (NO IV)(CPT=70450)    Result Date: 7/12/2023  PROCEDURE:  CT STROKE BRAIN (NO IV)(CPT=70450)  COMPARISON:  [facility] , CT, CT BRAIN OR HEAD ([id number]), 6/08/2023, 0:25 AM.  INDICATIONS:  rule out stroke  TECHNIQUE:  Noncontrast CT scanning is performed through the brain. Dose reduction techniques were used. Dose information is transmitted to the [facility]) NRDR ([address]) which includes the Dose Index Registry. PATIENT STATED HISTORY: (As transcribed by Technologist)  Left sided weakness. FINDINGS:  VENTRICLES/SULCI:  There is a moderate amount of blood within the right lateral ventricle extending into the atrium towards the right temporal horn. The basal cisterns are patent. The cerebellar tonsils are not low lying. Moderate atrophy. INTRACRANIAL:  There is a large acute parenchymal hemorrhage centered over the right thalamus measuring 3.6 x 2.6 x 3.1 cm in the greatest craniocaudad, AP, transverse dimensions. There is a moderate amount of surrounding edema with local mass effect and slight midline shift measuring 0.5 cm. Findings are superimposed on moderate to severe chronic deep white matter ischemic change in the cerebrum. There are old lacunar infarcts within the basal ganglia. There is some chronic microvascular disease in the posterior pontine fibers unchanged. No acute cerebellar infarct. SINUSES:           No sign of acute sinusitis. MASTOIDS:          No sign of acute inflammation.  SKULL:             No evidence for fracture or osseous abnormality. OTHER:             None. CONCLUSION:  There is an acute parenchymal hemorrhage centered over the right thalamus measuring 3.6 x 2.6 x 3.1 cm with surrounding edema, mild midline shift with a moderate amount of blood in the right lateral ventricle and slight midline shift is 0.5 cm. Marked chronic deep white matter ischemic change noted in the background within the cerebrum. Critical results were called to Dr. Shiraz Hunt at [phone] hours on 7/12/2023. There was appropriate read back. LOCATION:  [id number]   Dictated by (CST): Neo Saunders MD on 7/12/2023 at 7:50 AM     Finalized by (CST): Neo Saunders MD on 7/12/2023 at 7:55 AM       XR CHEST PA + LAT CHEST (CPT=71046)    Result Date: 6/16/2023  PROCEDURE:  XR CHEST PA + LAT CHEST (CPT=71046)  INDICATIONS:  increasing confusion/son wants patient placed in memory care unit at [facility]  COMPARISON:  [facility] , XR, XR CHEST AP PORTABLE  (CPT=71045), 6/07/2023, 11:17 PM.  TECHNIQUE:  PA and lateral chest radiographs were obtained. PATIENT STATED HISTORY: (As transcribed by Technologist)  Confusion. FINDINGS:  LUNGS:  Cardiomediastinal silhouette and pulmonary vasculature are stable when compared to 6/7/2023 chest radiograph. Hyperinflation of the lungs with scattered interstitial and mild airspace opacity. No new focal consolidation, pleural effusion, or pneumothorax. CONCLUSION:  1. Stable chest radiograph when compared to 6/7/2023 examination. ED M.GERSON notified of these findings with preliminary radiology report from [facility] services. LOCATION:  [facility]   Dictated by (CST): Vicky Floyd MD on 6/16/2023 at 7:46 AM     Finalized by (CST): Vicky Floyd MD on 6/16/2023 at 7:48 AM                 MDM      Patient is an 79-year-old female who presents from a local nursing home rehab facility with symptoms concerning for stroke.   On arrival she has left-sided facial asymmetry with gaze preference to the right, left facial droop and flaccid paralysis on the left. NIH is still in progress but at a minimum is 12. Initially reported that her last normal time is 630 this morning but after calling the nursing home, dayshift staff woke her up at 650 but. The last time anyone saw her was 6 AM, and she was sleeping at that time. Last normal time will unfortunately be bedtime last night, and current staff who is on duty there does not know what time she went to bed. She is also on Eliquis for history of PE so she would not be a candidate for lytic therapy based on duration of symptoms and anticoagulation. She will go for CT and CTA of the brain for further evaluation. Stroke navigator is at bedside and discussed the case with stroke neurology. Admission disposition: 7/12/2023  7:44 AM           Update at 7:55 AM.  CT demonstrates intracranial hemorrhage in the right basal ganglia with extension to the lateral ventricle. Case discussed with Dr. Darlin Denton from neurosurgery who requests Andexxa for reversal of Eliquis. This has been ordered. Patient is back from CT in the room and is still awake and alert and following commands. I did attempt to call her son Eugenia Méndez who is listed as her healthcare power of , no answer. Patient will go to neuro ICU for close monitoring. A total of 40 minutes of critical care time (exclusive of billable procedures) was administered to manage the patient's neurologic instability due to her left-sided weakness with intracranial hemorrhage on CT, anticoagulation. This involved direct patient intervention, complex decision making, and/or extensive discussions with the patient, family, and clinical staff.       Social -nursing home resident  Family History-noncontributory  Past Medical History-dementia, PE    Differential diagnosis before testing included ischemic stroke, intracranial hemorrhage, intracranial mass    Co-morbidities that add to the complexity of management include: Anticoagulation with Eliquis, unknown last normal time    Testing ordered during this visit included labs, CT brain    Radiographic images  I personally reviewed the radiographs and my individual interpretation shows intracranial hemorrhage in the right basal ganglia  I also reviewed the official reports that showed intracranial hemorrhage in the right basal ganglia extending to the lateral ventricle      History obtained by an independent source included from paramedics, nursing home staff    Discussion of management with stroke navigator, neurosurgery, hospitalist        Medications Provided: Andexxa, Cardene    Course of Events during Emergency Room Visit include remained awake and alert. Still following commands. Disposition:    Admission  I have discussed with the patient the results of test, differential diagnosis, and treatment plan. They expressed clear understanding of these instructions and agrees to the plan provided. Medical Decision Making      Disposition and Plan     Clinical Impression:  Intracranial hemorrhage ([address]  (primary encounter diagnosis)  Current use of long term anticoagulation     Disposition:  Admit  7/12/2023  7:44 am    Follow-up:  No follow-up provider specified.         Medications Prescribed:  Current Discharge Medication List                          Hospital Problems       Present on Admission  Date Reviewed: 6/7/2023            ICD-10-CM Noted POA    * (Principal) Intracranial hemorrhage ([address] I62.9 7/12/2023 Unknown

## 2023-07-12 NOTE — SLP NOTE
ADULT SWALLOWING EVALUATION    ASSESSMENT    ASSESSMENT/OVERALL IMPRESSION:  Patient seen for swallowing evaluation per protocol. Patient admitted with left sided weakness. Imaging revealed right thalamic hemorrhage measuring 3.6 x 2.6 x 3.1 cm. Patient with history of dementia, breast ca, hypothyroidism and PE on DOAC. Patient family present and supportive and contribute to history. Patient moved to this area in May from [place] and has had difficulty with transitioning per family. She had a recent stay at [facility] where she was diagnosed with anxiety, depression and dementia. No history of dysphagia reported. Patient alert and up in bed, eyes closed but responsive and following commands. Oral mechanism exam remarkable for reduced left facial tone, strength and ROM. No lingual deviation appreciated. SLP presented moist swab, ice chips, puree, mildly thick liquids by tsp and thin by straw and tsp. Patient with intact oral retrieval and containment with all trials. Oral prep and transit impaired by reduced attention to task and reduced bolus formation and propulsion which appeared to be related to reduced attention to task and oral weakness. Patient mastication impaired due to impaired attention to task. There was no oral residue noted after any trials. There were no overt signs of aspiration though SLP controlled rate and bite/sip size very carefully throughout this visit. Patient presents with mild oral dysphagia impacted by cognition and facial weakness. Recommend puree diet and thin liquids by tsp only observing strict aspiration precautions and feeding only when alert and responsive (though tended to keep her eyes closed during my visit). Discussed findings and recommendations along with rationale with family after completing evaluation.  Discussed plan to continue to assess po tolerance and assess to advance po diet as status allows as well as cognitive communication status once stable. Willis Assessment of Swallow Function Score: No abnormality detected (198)    RECOMMENDATIONS   Diet Recommendations - Solids: Puree  Diet Recommendations - Liquids: Thin Liquids (liquids by teaspoon only)                        Compensatory Strategies Recommended: No straws; Liquids via spoon;Small bites and sips; Slow rate; Alternate consistencies  Aspiration Precautions: Upright position; Slow rate;Small bites and sips; No straw  Medication Administration Recommendations: One pill at a time; Whole in puree  Treatment Plan/Recommendations: Dysphagia therapy;Communication evaluation  Discharge Recommendations/Plan: Undetermined    HISTORY   MEDICAL HISTORY  Reason for Referral: Stroke protocol    Problem List  Principal Problem:    Nontraumatic thalamic hemorrhage [address] [address]  Active Problems:    Current use of long term anticoagulation      Past Medical History  Past Medical History:   Diagnosis Date    Anxiety     Cancer ([facility] [address]     Dementia ([facility] [address]     Depression     Pulmonary embolism ([facility] [address]     Suicidal ideations     SVT (supraventricular tachycardia) (H[facility])     Thyroid disease     Unsteadiness on feet     UTI (urinary tract infection)           Diet Prior to Admission: Regular; Thin liquids  Precautions: Aspiration; Seizure    Patient/Family Goals: to get better    SWALLOWING HISTORY  Current Diet Consistency: NPO  Dysphagia History: none  Imaging Results:   CT Stroke CTA Brain/Neck from 7/12/23 revealed:  CONCLUSION:       1. Large right thalamic hemorrhage with intraventricular extension is again noted. 2. No significant stenosis or aneurysmal dilatation involving major arterial structures in the head and neck. No evidence of active hemorrhage. This critical result was discussed with Dr. Kelley Leon at [facility] on 7/12/2023. Read back was performed.             LOCATION:  [facility]         Dictated by (CST): Vicci Dance, MD on 7/12/2023 at 8:01 AM       Finalized by (CST): Tyron Zarate MD on 7/12/2023 at 8:09 AM     CXR from 7/12/23 revealed:  ONCLUSION:  The heart size is upper limits of normal.  There is venous congestion with some interstitial edema suggesting early CHF versus fluid overload. No mass or pneumonia. No effusion or pneumothorax. Mild peribronchial thickening may represent   underlying bronchitis. LOCATION:  [id number]                  Dictated by (CST): Lulu Scanlon MD on 7/12/2023 at 8:51 AM       Finalized by (CST): Lulu Scanlon MD on 7/12/2023 at 8:52 AM     SUBJECTIVE       OBJECTIVE   ORAL MOTOR EXAMINATION  Dentition: Functional  Symmetry: Reduced left facial  Strength: Reduced left facial  Tone: Reduced left facial  Range of Motion: Reduced left facial  Rate of Motion: Reduced    Voice Quality: Clear  Respiratory Status: Unlabored  Consistencies Trialed: Thin liquids; Nectar thick liquids;Puree;Hard solid  Method of Presentation: Staff/Clinician assistance;Spoon;Straw;Single sips  Patient Positioning: Upright;Midline (in bed)    Oral Phase of Swallow: Within Functional Limits                      Pharyngeal Phase of Swallow: Within Functional Limits           (Please note: Silent aspiration cannot be evaluated clinically. Videofluoroscopic Swallow Study is required to rule-out silent aspiration.)    Esophageal Phase of Swallow: No complaints consistent with possible esophageal involvement              GOALS  Goal #1 The patient will tolerate puree consistency and thin liquids without overt signs or symptoms of aspiration with 100 % accuracy over 2-4 session(s). In Progress   Goal #2 The patient/family/caregiver will demonstrate understanding and implementation of aspiration precautions and swallow strategies independently over 2-4 session(s). In Progress   Goal #3 The patient will tolerate trial upgrade of soft/solid consistency and NA liquids without overt signs or symptoms of aspiration with 100 % accuracy over 1-2 session(s).   In Progress   Goal #4 Patient will participate in cognitive communication evaluation    In Progress     FOLLOW UP  Treatment Plan/Recommendations: Dysphagia therapy;Communication evaluation  Number of Visits to Meet Established Goals: 4  Follow Up Needed (Documentation Required): Yes  SLP Follow-up Date: 07/13/23    Thank you for your referral.   If you have any questions, please contact [facility] [phone]  Pager [phone]

## 2023-07-12 NOTE — ED INITIAL ASSESSMENT (HPI)
Patient in per EMS from [facility] [place] per staff however time frame unclear.  L sided weakness + thinners

## 2023-07-12 NOTE — SIGNIFICANT EVENT
Called to B1 for stroke alert at 0723  Met patient in the launch pad at 0725  Last Known Normal at 2100  Pre-morbid mRS 2    Initial NIHSS 11   Pt accompanied to CT dept at 0730  Dr Farnaz Garcia (Neuro Critical Care) notified at [facility]  Dr Rafaela Hardwick (Neuro Interventional Radiology) notified at 229  Repeat NIHSS completed back in ED room    NIH Stroke Scale  1a  Level of consciousness: 0   1b. LOC questions:  2   1c. LOC commands: 0   2. Best Gaze: 1 (right gaze preference)   3. Visual: 0   4. Facial Palsy: 1 (left facial droop)   5a. Motor left arm: 3 (cannot raise arm, moves fingers)   5b. Motor right arm: 0   6a. Motor left le (raises leg about 1 inch off bed, then it falls)   6b  Motor right le   7. Limb Ataxia: 0   8. Sensory: 0   9. Best Language:  1 (mild expressive aphasia, some obvious loss of fluency, names some objects)   10. Dysarthria: 1 (mild slurred speech, can be understood without difficulty)   11. Extinction and Inattention: 0     Total:   11        Dr Farnaz Garcia and Dr Rafaela Hardwick updated on patient's status, CT/CTA results and repeat NIHSS   Per Dr Farnaz Garcia, patient is not a candidate for TNK, exclusions include bleed on CT, recommends SBP range 100-150  Per Dr Rafaela Hardwick, patient is not a candidate for neuro intervention, recommends starting Andexxa  Case discussed with Dr Kelley Leon, ED  Dysphagia evaluation prior to administration of oral medications discussed with patient's RN     Of note: Spoke with Destiny Yan, patient's nurse from [facility]. Destiny Yan confirms the last time patient was seen at her baseline was about 2100 lastnight before bed. She was found this morning at 0650 abnormal. Destiny Yan reports that at baseline, patient is a/ox3-4 (periods of mild confusion), walks with a walker, very mobile, uses the bathroom independently. Patient requests that her son Walter iGron be notified. Andres Thomas, no answer.

## 2023-07-13 LAB
ANION GAP SERPL CALC-SCNC: 5 MMOL/L (ref 0–18)
ATRIAL RATE: 152 BPM
BUN BLD-MCNC: 12 MG/DL (ref 7–18)
CALCIUM BLD-MCNC: 8.2 MG/DL (ref 8.5–10.1)
CHLORIDE SERPL-SCNC: 110 MMOL/L (ref 98–112)
CO2 SERPL-SCNC: 25 MMOL/L (ref 21–32)
CREAT BLD-MCNC: 0.75 MG/DL
ERYTHROCYTE [DISTWIDTH] IN BLOOD BY AUTOMATED COUNT: 15.4 %
GFR SERPLBLD BASED ON 1.73 SQ M-ARVRAT: 78 ML/MIN/1.73M2 (ref 60–?)
GLUCOSE BLD-MCNC: 129 MG/DL (ref 70–99)
GLUCOSE BLD-MCNC: 130 MG/DL (ref 70–99)
GLUCOSE BLD-MCNC: 138 MG/DL (ref 70–99)
GLUCOSE BLD-MCNC: 190 MG/DL (ref 70–99)
HCT VFR BLD AUTO: 39 %
HGB BLD-MCNC: 12.7 G/DL
MCH RBC QN AUTO: 27.8 PG (ref 26–34)
MCHC RBC AUTO-ENTMCNC: 32.6 G/DL (ref 31–37)
MCV RBC AUTO: 85.3 FL
OSMOLALITY SERPL CALC.SUM OF ELEC: 292 MOSM/KG (ref 275–295)
PLATELET # BLD AUTO: 217 10(3)UL (ref 150–450)
POTASSIUM SERPL-SCNC: 3.9 MMOL/L (ref 3.5–5.1)
POTASSIUM SERPL-SCNC: 3.9 MMOL/L (ref 3.5–5.1)
Q-T INTERVAL: 312 MS
QRS DURATION: 96 MS
QTC CALCULATION (BEZET): 496 MS
R AXIS: 231 DEGREES
RBC # BLD AUTO: 4.57 X10(6)UL
SODIUM SERPL-SCNC: 140 MMOL/L (ref 136–145)
T AXIS: 88 DEGREES
VENTRICULAR RATE: 152 BPM
WBC # BLD AUTO: 9.4 X10(3) UL (ref 4–11)

## 2023-07-13 PROCEDURE — 99291 CRITICAL CARE FIRST HOUR: CPT | Performed by: NEUROLOGICAL SURGERY

## 2023-07-13 PROCEDURE — 99291 CRITICAL CARE FIRST HOUR: CPT | Performed by: INTERNAL MEDICINE

## 2023-07-13 RX ORDER — METOPROLOL TARTRATE 5 MG/5ML
2.5 INJECTION INTRAVENOUS EVERY 6 HOURS
Status: DISCONTINUED | OUTPATIENT
Start: 2023-07-13 | End: 2023-07-13

## 2023-07-13 RX ORDER — METOPROLOL TARTRATE 5 MG/5ML
INJECTION INTRAVENOUS
Status: COMPLETED
Start: 2023-07-13 | End: 2023-07-13

## 2023-07-13 RX ORDER — ENOXAPARIN SODIUM 100 MG/ML
40 INJECTION SUBCUTANEOUS DAILY
Status: DISCONTINUED | OUTPATIENT
Start: 2023-07-13 | End: 2023-07-18

## 2023-07-13 RX ORDER — METOPROLOL TARTRATE 5 MG/5ML
5 INJECTION INTRAVENOUS ONCE
Status: COMPLETED | OUTPATIENT
Start: 2023-07-13 | End: 2023-07-13

## 2023-07-13 RX ORDER — MULTIPLE VITAMINS W/ MINERALS TAB 9MG-400MCG
1 TAB ORAL DAILY
Status: DISCONTINUED | OUTPATIENT
Start: 2023-07-13 | End: 2023-07-18

## 2023-07-13 NOTE — PROGRESS NOTES
Notified by RN patient having 10-20 second episodes of sinus tachycardia. At time of notification, HR was in the 80s. Will monitor for now. No further orders.

## 2023-07-13 NOTE — PLAN OF CARE
Received patient at 0730. Neuro exam stable. See doc flow sheet. Pt had 3 episodes of SVT with HR in 130-150. [address] Cardiology team aware. Pt started on PO metoprolol. Pt cleared for diet with puree and nectar thick liquids. Pt tolerating PO medication crushed with apple sauce. Pt voiding via purewick. Transfer orders received and report given to 7 tele RNJeb.

## 2023-07-13 NOTE — PROGRESS NOTES
Per RN, pt converted back to sinus tachycardia after Lopressor with HR 130s-140s. Additional dose of lopressor 2.5 mg IVP ordered.

## 2023-07-13 NOTE — PLAN OF CARE
Patient received AO to self and place, cardene infusing. VSS, NSR w/ PVCs on monitor. Neuros Q1, see flowsheets for more detailed assessment. Turned Q2. [address] changed and in place for incontinence. RN tested PO intake of fluids and puree but patient had difficulty this shift so remained NPO with reassessment in AM by speech and fluids running. Cardene shut off and blood pressure able to remain <150 without it. Pt kept having these occasional episodes of ST in the 140s for around 10-20 seconds, did not appear symptomatic during these episodes; [facility] made aware. Pt began sustained episode of SVT at 245 this morning and woke up anxious; RNs consoled pt while getting 12 Lead EKG to confirm, spoke with Yovana MARTINI, Lopressor 5mg given with patient hr fluctuating but then sustaining in [address] Lopressor 2.5 ordered but hr stabilized prior to giving it so held dose, updated Yovana CARLOSN. See flowsheets for details. Spoke to son this morning; gave updates, answered questions, and encouraged involvement in rounding to discus patient's plan of care. Problem: NEUROLOGICAL - ADULT  Goal: Achieves stable or improved neurological status  Description: INTERVENTIONS  - Assess for and report changes in neurological status  - Initiate measures to prevent increased intracranial pressure  - Maintain blood pressure and fluid volume within ordered parameters to optimize cerebral perfusion and minimize risk of hemorrhage  - Monitor temperature, glucose, and sodium.  Initiate appropriate interventions as ordered  Outcome: Progressing  Goal: Absence of seizures  Description: INTERVENTIONS  - Monitor for seizure activity  - Administer anti-seizure medications as ordered  - Monitor neurological status  Outcome: Progressing  Goal: Remains free of injury related to seizure activity  Description: INTERVENTIONS:  - Maintain airway, patient safety  and administer oxygen as ordered  - Monitor patient for seizure activity, document and report duration and description of seizure to MD/LIP  - If seizure occurs, turn patient to side and suction secretions as needed  - Reorient patient post seizure  - Seizure pads on all 4 side rails  - Instruct patient/family to notify RN of any seizure activity  - Instruct patient/family to call for assistance with activity based on assessment  Outcome: Progressing  Goal: Achieves maximal functionality and self care  Description: INTERVENTIONS  - Monitor swallowing and airway patency with patient fatigue and changes in neurological status  - Encourage and assist patient to increase activity and self care with guidance from PT/OT  - Encourage visually impaired, hearing impaired and aphasic patients to use assistive/communication devices  Outcome: Progressing     Problem: SAFETY ADULT - FALL  Goal: Free from fall injury  Description: INTERVENTIONS:  - Assess pt frequently for physical needs  - Identify cognitive and physical deficits and behaviors that affect risk of falls.   - [place] fall precautions as indicated by assessment.  - Educate pt/family on patient safety including physical limitations  - Instruct pt to call for assistance with activity based on assessment  - Modify environment to reduce risk of injury  - Provide assistive devices as appropriate  - Consider OT/PT consult to assist with strengthening/mobility  - Encourage toileting schedule  Outcome: Progressing     Problem: GASTROINTESTINAL - ADULT  Goal: Maintains adequate nutritional intake (undernourished)  Description: INTERVENTIONS:  - Monitor percentage of each meal consumed  - Identify factors contributing to decreased intake, treat as appropriate  - Assist with meals as needed  - Monitor I&O, WT and lab values  - Obtain nutritional consult as needed  - Optimize oral hygiene and moisture  - Encourage food from home; allow for food preferences  - Enhance eating environment  Outcome: Progressing     Problem: Impaired Swallowing  Goal: Minimize aspiration risk  Description: Interventions:  - Patient should be alert and upright for all feedings (90 degrees preferred)  - Offer food and liquids at a slow rate  - No straws  - Encourage small bites of food and small sips of liquid  - Offer pills one at a time, crush or deliver with applesauce as needed  - Discontinue feeding and notify MD (or speech pathologist) if coughing or persistent throat clearing or wet/gurgly vocal quality is noted  Outcome: Progressing     Problem: Impaired Cognition  Goal: Patient will exhibit improved attention, thought processing and/or memory  Description: Interventions:  - Minimize distractions in the room when full attention is required  - Allow additional time for processing after asking questions or providing instructions  Outcome: Progressing     Problem: Impaired Communication  Goal: Patient will achieve maximal communication potential  Description: Interventions:  - Encourage use of alternative/augmentative communication to express basic wants and needs (use of communication/letter board/or smartphone/tablet/handwriting)  - Allow additional time for processing after asking questions or providing instructions  Outcome: Progressing

## 2023-07-13 NOTE — PROGRESS NOTES
Notified by RN patient in SVT with rate in the 150s. RN reports patient is anxious. Staff attempting to obtain EKG. Lopressor 5 mg IVP x 1 ordered.

## 2023-07-13 NOTE — PHYSICAL THERAPY NOTE
PHYSICAL THERAPY EVALUATION - INPATIENT     Room Number: 2057/5112-D  Evaluation Date: 7/13/2023  Type of Evaluation: Initial  Physician Order: PT Eval and Treat    Presenting Problem: non traumatic R thalamic hemorrhage  Co-Morbidities : hypertension, hypothyroidism chronic anxiety depression history of breast cancer 2019 status postlumpectomy radiation  Reason for Therapy: Mobility Dysfunction and Discharge Planning    History related to current admission: Patient is a 79year old female admitted on 7/12/2023 presented from [facility] with L facial drop and L hemiparesis, \"CT demonstrated a right thalamic hemorrhage with intraventricular extension\", no acute neuro surg intervention    ASSESSMENT   In this PT evaluation, the patient presents with the following impairments 1) Eyes closed throughout session, would open partially for a few seconds, 2x throughout, 2) Increased extensor tone in LLE - Modified Keegan 3 in L PF, knee flexion, and hip flexion, 3) Impaired dynamic balance - retropulsive during STS transfer trials x2 requiring max A x2 - not weight bearing through LLE, 4) Following one step commands on R appropriately. 5) Posture - R cervical rotation and extension - able to perform AROM with increase prompting to the L ~50% of total motion, used pillow and towel roll to attempt midline posture. - likely some inattention. These impairments and comorbidities manifest themselves as functional limitations in independent bed mobility, transfers, and gait. The patient is below baseline and would benefit from skilled inpatient PT to address the above deficits to assist patient in returning to prior to level of function. Functional outcome measures completed include AMPA. The AM-PAC '6-Clicks' Inpatient Basic Mobility Short Form was completed and this patient is demonstrating a Approx Degree of Impairment: 81.38%  degree of impairment in mobility.  Research supports that patients with this level of impairment may benefit from [facility]. DISCHARGE RECOMMENDATIONS  PT Discharge Recommendations: Sub-acute rehabilitation    PLAN  PT Treatment Plan: Bed mobility; Body mechanics; Coordination; Endurance; Energy conservation;Patient education; Family education;Gait training;Range of motion; Neuromuscular re-educate;Strengthening;Transfer training;Balance training  Rehab Potential : Good  Frequency (Obs): 3-5x/week  Number of Visits to Meet Established Goals: 5      CURRENT GOALS    Goal #1 Patient is able to demonstrate supine - sit EOB @ level: moderate assistance     Goal #2 Patient is able to demonstrate transfers EOB to/from Chair/Wheelchair at assistance level: moderate assistance     Goal #3 Patient is able to ambulate 10 feet with assist device: walker - rolling at assistance level: maximum assistance     Goal #4    Goal #5    Goal #6    Goal Comments: Goals established on 2023    HOME SITUATION  Type of Home:  (From [facility])   Home Layout: One level                              Prior Level of [place]: PLOF obtained from  and EMR- Initially admitted from [facility] (there for PE) for short term rehab/[facility] at [facility] on 2023 - documented pt required assist with ADLs, was working with PT on generalized weakness and gait instability, prior to that was in [address] [facility] throughout session - \" help me, please help me\" - whispered voice with flat affect throughout the session       OBJECTIVE  Precautions: Bed/chair alarm (-150)  Fall Risk: High fall risk    WEIGHT BEARING RESTRICTION  Weight Bearing Restriction: None                PAIN ASSESSMENT  Ratin          COGNITION  Following Commands:  follows one step commands consistently  Awareness of Deficits:  not aware of deficits    RANGE OF MOTION AND STRENGTH ASSESSMENT  Upper extremity ROM and strength are below functional limits - see OT note for details    Lower extremity ROM is below functional limits on the L due to increased tone     Lower extremity strength is within functional limits on the R, below on the L, unable to assess 2/2 inc tone in knee extension and DF      BALANCE  Static Sitting: Poor +  Dynamic Sitting: Poor +  Static Standing: Poor -  Dynamic Standing: Not tested    ADDITIONAL TESTS                                    ACTIVITY TOLERANCE                         O2 WALK       NEUROLOGICAL FINDINGS                        AM-PAC '6-Clicks' INPATIENT SHORT FORM - BASIC MOBILITY  How much difficulty does the patient currently have. .. Patient Difficulty: Turning over in bed (including adjusting bedclothes, sheets and blankets)?: A Lot   Patient Difficulty: Sitting down on and standing up from a chair with arms (e.g., wheelchair, bedside commode, etc.): A Lot   Patient Difficulty: Moving from lying on back to sitting on the side of the bed?: A Lot   How much help from another person does the patient currently need. ..    Help from Another: Moving to and from a bed to a chair (including a wheelchair)?: Total   Help from Another: Need to walk in hospital room?: Total   Help from Another: Climbing 3-5 steps with a railing?: Total       AM-PAC Score:  Raw Score: 9   Approx Degree of Impairment: 81.38%   Standardized Score (AM-PAC Scale): 30.55   CMS Modifier (G-Code): CM    FUNCTIONAL ABILITY STATUS  Gait Assessment   Functional Mobility/Gait Assessment  Gait Assistance: Not tested  Distance (ft): 0    Skilled Therapy Provided     Bed Mobility:  Rolling: max A x2  Supine to sit: max A X2   Sit to supine: max A X2     Transfer Mobility:  Sit to stand: max A x2   Stand to sit: max A   Gait = NT    Therapist's Comments: pt able to follow commands appropriately on the R performing heel slides, SLR, SAQ, and ankle DF/PF, LAQ, on the L -LLE with inc tone Keegan 3 - difficulty performing any PROM, eventually able to obtain some knee flexion ~25% sitting EOB to help facilitate STS transfer however pt retropulsive  on both trials, not using LUE functionally on RW during transfer, intermittent R lateral lean in sitting statically, able to perform shifts to midline with tactile cues however unable to maintain     Exercise/Education Provided:  Bed mobility  Body mechanics  Functional activity tolerated  Posture  Transfer training    Patient End of Session: In bed;Needs met;Call light within reach;RN aware of session/findings; All patient questions and concerns addressed; Alarm set      Patient Evaluation Complexity Level:  History High - 3 or more personal factors and/or co-morbidities   Examination of body systems High - addressing a total of 4 or more elements   Clinical Presentation High - Unstable   Clinical Decision Making High - Unstable       PT Session Time: 25 minutes  Gait Training:  minutes  Therapeutic Activity:  minutes  Neuromuscular Re-education:  minutes  Therapeutic Exercise: 15 minutes

## 2023-07-13 NOTE — BH PROGRESS NOTE
Met with patient for PHQ follow-up. Patient's son was present and provided additional information. Patient reported depression and anxiety. Patient's son stated that they have services through [facility] and would like to continue with their providers and denied any other resources and did not want to change providers. Patient denied SI, HI, AVH and SIB.

## 2023-07-13 NOTE — SLP NOTE
ADULT SWALLOWING EVALUATION    ASSESSMENT    ASSESSMENT/OVERALL IMPRESSION:  Patient seen to reassess swallow function as patient noted to cough with thin liquids after initial SLP evaluation completed yesterday. Patient received in bed, eyes closed but verbally responsive throughout. She keeps her head turned to the right, tilted to the left and in neck extension which is new from yesterday. SLP able to reposition using blanket behind her pillow to achieve more head neutral position though with persistent head turn to right and tilt to left. Oral mechanism exam unchanged from yesterday with persistent reduced left facial tone, strength and ROM. Tongue is midline. SLP presented moist swab, puree, thin liquids by tsp and mildly thick liquids by tsp. Liquid presentations via tsp utilized to allow for better volume control of liquid presentations. Patient with intact oral retrieval and containment with all po trials. Suspect reduced bolus control with thin liquids intermittently leading to suspected uncontrolled spillage to pharynx. Laryngeal excursion strength WFL though timing variable with some suspected delay. There was an immediate cough response after thin liquids by tsp but no other overt signs of aspiration with puree and mildly thick liquids by tsp. Patient presenting with change in swallow function compared to my initial evaluation yesterday. Recommend change to puree and mildly thick liquids by tsp with 1:1 feeding assistance, feeding only when alert. Patient son arrived after my visit and SLP reviewed all above including recommendations for po diet and plan moving forward for ongoing dysphagia assessment and communication evaluation as appropriate.      Willis Assessment of Swallow Function Score: No abnormality detected (193)    RECOMMENDATIONS   Diet Recommendations - Solids: Puree  Diet Recommendations - Liquids: Nectar thick liquids/ Mildly thick (by tsp only)                        Compensatory Strategies Recommended: No straws; Liquids via spoon; Slow rate;Small bites and sips (1:1 feeding assist)  Aspiration Precautions: Upright position; Slow rate;Small bites and sips; No straw  Medication Administration Recommendations: One pill at a time; Whole in puree;Crushed in puree (as tolerated)  Treatment Plan/Recommendations: Dysphagia therapy;Communication evaluation (possible VFSS pending clinical progress)  Discharge Recommendations/Plan: Undetermined    HISTORY   MEDICAL HISTORY  Reason for Referral: Stroke protocol    Problem List  Principal Problem:    Nontraumatic thalamic hemorrhage ([address]  Active Problems:    Current use of long term anticoagulation    Intracranial hemorrhage (HCC)      Past Medical History  Past Medical History:   Diagnosis Date    Anxiety     Cancer ([address]     Dementia ([address]     Depression     Pulmonary embolism (HCC)     Suicidal ideations     SVT (supraventricular tachycardia) (HCC)     Thyroid disease     Unsteadiness on feet     UTI (urinary tract infection)           Diet Prior to Admission: Regular; Thin liquids  Precautions: Aspiration; Seizure    Patient/Family Goals: to get better    SWALLOWING HISTORY  Current Diet Consistency: NPO  Dysphagia History: None prior to this hospitalization  Imaging Results:   Brain CT from 7/12/23 revealed:  Impression   CONCLUSION:  Large right thalamic hemorrhage with intraventricular extension along with hemorrhage extending into right coronal radiata is stable.   Associated mass effect is stable           LOCATION:  [facility]        Dictated by (CST): Juan Crook MD on 7/12/2023 at 2:03 PM      Finalized by (CST): Juan Crook MD on 7/12/2023 at 2:07 PM         SUBJECTIVE       OBJECTIVE   ORAL MOTOR EXAMINATION  Dentition: Functional  Symmetry: Reduced left facial  Strength: Reduced left facial  Tone: Reduced left facial  Range of Motion: Reduced left facial  Rate of Motion: Reduced    Voice Quality: Clear  Respiratory Status: Unlabored  Consistencies Trialed: Thin liquids; Nectar thick liquids;Puree (all liquies by tsp)  Method of Presentation: Staff/Clinician assistance;Spoon  Patient Positioning: Upright;Midline (in bed, head turned to right, tilted to left and in neck extension)    Oral Phase of Swallow: Impaired  Bolus Retrieval: Intact  Bilabial Seal: Intact  Bolus Formation: Impaired  Bolus Propulsion: Intact     Retention: Intact    Pharyngeal Phase of Swallow: Impaired  Laryngeal Elevation Timing: Appears impaired  Laryngeal Elevation Strength: Appears intact  Laryngeal Elevation Coordination: Appears intact  (Please note: Silent aspiration cannot be evaluated clinically. Videofluoroscopic Swallow Study is required to rule-out silent aspiration.)    Esophageal Phase of Swallow: No complaints consistent with possible esophageal involvement              GOALS  Goal #1 The patient will tolerate puree consistency and mildly thick liquids without overt signs or symptoms of aspiration with 100 % accuracy over 2-4 session(s). In Progress   Goal #2 The patient/family/caregiver will demonstrate understanding and implementation of aspiration precautions and swallow strategies independently over 2-4 session(s). In Progress   Goal #3 The patient will tolerate trial upgrade of soft/solid consistency and thin liquids without overt signs or symptoms of aspiration with 100 % accuracy over 1-2 session(s).   In Progress   Goal #4 Patient will participate in cognitive communication assessment    In Progress     FOLLOW UP  Treatment Plan/Recommendations: Dysphagia therapy;Communication evaluation (possible VFSS pending clinical progress)  Number of Visits to Meet Established Goals: 5  Follow Up Needed (Documentation Required): Yes  SLP Follow-up Date: 07/14/23    Thank you for your referral.   If you have any questions, please contact Jaspreet Lopez [phone] [facility]  Pager [phone]

## 2023-07-14 ENCOUNTER — APPOINTMENT (OUTPATIENT)
Dept: CV DIAGNOSTICS | Facility: HOSPITAL | Age: 83
End: 2023-07-14
Attending: INTERNAL MEDICINE
Payer: MEDICARE

## 2023-07-14 LAB
ANION GAP SERPL CALC-SCNC: 7 MMOL/L (ref 0–18)
BILIRUB UR QL STRIP.AUTO: NEGATIVE
BUN BLD-MCNC: 12 MG/DL (ref 7–18)
CALCIUM BLD-MCNC: 8 MG/DL (ref 8.5–10.1)
CHLORIDE SERPL-SCNC: 110 MMOL/L (ref 98–112)
CO2 SERPL-SCNC: 20 MMOL/L (ref 21–32)
COLOR UR AUTO: YELLOW
CREAT BLD-MCNC: 0.85 MG/DL
GFR SERPLBLD BASED ON 1.73 SQ M-ARVRAT: 68 ML/MIN/1.73M2 (ref 60–?)
GLUCOSE BLD-MCNC: 131 MG/DL (ref 70–99)
GLUCOSE BLD-MCNC: 138 MG/DL (ref 70–99)
GLUCOSE UR STRIP.AUTO-MCNC: 50 MG/DL
KETONES UR STRIP.AUTO-MCNC: 20 MG/DL
LEUKOCYTE ESTERASE UR QL STRIP.AUTO: NEGATIVE
MAGNESIUM SERPL-MCNC: 2.2 MG/DL (ref 1.6–2.6)
NITRITE UR QL STRIP.AUTO: NEGATIVE
OSMOLALITY SERPL CALC.SUM OF ELEC: 286 MOSM/KG (ref 275–295)
PH UR STRIP.AUTO: 7 [PH] (ref 5–8)
POTASSIUM SERPL-SCNC: 3.5 MMOL/L (ref 3.5–5.1)
POTASSIUM SERPL-SCNC: 3.5 MMOL/L (ref 3.5–5.1)
POTASSIUM SERPL-SCNC: 4.3 MMOL/L (ref 3.5–5.1)
PROT UR STRIP.AUTO-MCNC: 30 MG/DL
RBC UR QL AUTO: NEGATIVE
SODIUM SERPL-SCNC: 137 MMOL/L (ref 136–145)
SP GR UR STRIP.AUTO: 1.02 (ref 1–1.03)
UROBILINOGEN UR STRIP.AUTO-MCNC: 2 MG/DL

## 2023-07-14 PROCEDURE — 93306 TTE W/DOPPLER COMPLETE: CPT | Performed by: INTERNAL MEDICINE

## 2023-07-14 PROCEDURE — 99233 SBSQ HOSP IP/OBS HIGH 50: CPT | Performed by: OTHER

## 2023-07-14 RX ORDER — LORAZEPAM 2 MG/ML
0.5 INJECTION INTRAMUSCULAR ONCE
Status: COMPLETED | OUTPATIENT
Start: 2023-07-14 | End: 2023-07-15

## 2023-07-14 RX ORDER — LEVETIRACETAM 500 MG/5ML
500 INJECTION, SOLUTION, CONCENTRATE INTRAVENOUS EVERY 12 HOURS
Status: DISCONTINUED | OUTPATIENT
Start: 2023-07-14 | End: 2023-07-18

## 2023-07-14 RX ORDER — PAROXETINE 30 MG/1
30 TABLET, FILM COATED ORAL EVERY MORNING
COMMUNITY
End: 2023-07-18

## 2023-07-14 RX ORDER — LORAZEPAM 2 MG/ML
0.5 INJECTION INTRAMUSCULAR ONCE AS NEEDED
Status: DISCONTINUED | OUTPATIENT
Start: 2023-07-14 | End: 2023-07-14

## 2023-07-14 RX ORDER — POTASSIUM CHLORIDE 20 MEQ/1
40 TABLET, EXTENDED RELEASE ORAL EVERY 4 HOURS
Status: COMPLETED | OUTPATIENT
Start: 2023-07-14 | End: 2023-07-14

## 2023-07-14 NOTE — DISCHARGE PLANNING
Attn: CENTRAL SCHEDULING DEPT.     Patient follow-up appointment information:    Visit Type: Stroke follow-up  Date of Stroke: 07/12/2023  Type of Stroke: Ischemic/Hemorrhagic  Patient to follow-up: 4 weeks  OP Neurologist: Dr Cedric Montes [facility] office)  Anticipated discharge (if known): TBD  Discharge disposition (if known): AD[facility] Kruse RN, BSN  Stroke Navigator  [phone]

## 2023-07-14 NOTE — DISCHARGE INSTRUCTIONS
THE [facility] and [facility] Stroke Support Group: [facility] (Stroke Treatment and [employer]) for survivors, family members, and caregivers    Aim: To help survivors and families adjust to changes in their lives after a stroke. When: 3rd Thursday of month  Time:   3:00-4:30 pm  Where:  Hybrid meeting (virtual via WebEx or in person at rotating locations)   To [place]:  Email [email or web address]     For [place] stroke support group call [phone] to register     For THE [facility] stroke support group call [phone] to register     Once registered, you will be sent a WebEx link via Email, as well as location information for in-person meeting. Other resources:  American Stroke Association:   [phone]  [address] [place] [address]:  [phone]      Diet Recommendations - Solids: Puree (1:1 feeding assistance)  Diet Recommendations - Liquids: Nectar thick liquids/ Mildly thick (single sips by straw ok)  Compensatory Strategies Recommended: Slow rate;Small bites and sips (cue to swallow as needed)  Aspiration Precautions: Upright position; Slow rate;Small bites and sips  Medication Administration Recommendations: One pill at a time; Whole in puree;Crushed in puree (as tolerated)

## 2023-07-14 NOTE — SLP NOTE
SPEECH DAILY NOTE - INPATIENT    ASSESSMENT & PLAN   ASSESSMENT  Pt seen for dysphagia tx to assess tolerance with recommended diet, ensure proper utilization of aspiration precautions and provide pt/family education. Attempted follow up earlier but deferred at family request as patient was lethargic at the time of that attempt. RN reported patient alert and tolerated breakfast this morning. She noted recent administration of medication which may be contributing to patient lethargy. Patient able to wake up with verbal and tactile stimuli. She required consistent stimulation to remain on task, though cooperative to the best of her ability. She was able to accept ice chip with intact oral retrieval and containment but required cueing to continue with bolus manipulation/attention to task. SLP also administered mildly thick liquids by tsp x1 though patient requiring cueing to stay on task and was eventually able to swallow without any subsequent overt signs of aspiration. Further po trials deferred given lethargy. SLP will continue to follow for ongoing assessment of po tolerance and pending clinical progress, may benefit from consideration for VFSS if overt tolerance of po does not improve as alertness improves. Discussed with RN. Diet Recommendations - Solids: Puree  Diet Recommendations - Liquids: Nectar thick liquids/ Mildly thick (by tsp only)    Compensatory Strategies Recommended: No straws; Liquids via spoon; Slow rate;Small bites and sips (1:1 feeding assist)  Aspiration Precautions: Upright position; Slow rate;Small bites and sips; No straw  Medication Administration Recommendations: One pill at a time; Whole in puree;Crushed in puree (as tolerated)    Patient Experiencing Pain: No                Discharge Recommendations  Discharge Recommendations/Plan: Undetermined    Treatment Plan  Treatment Plan/Recommendations: Dysphagia therapy;Communication evaluation (possible VFSS pending clinical progress)    Interdisciplinary Communication: Discussed with RN            GOALS  Goal #1 The patient will tolerate puree consistency and mildly thick liquids without overt signs or symptoms of aspiration with 100 % accuracy over 2-4 session(s). In Progress   Goal #2 The patient/family/caregiver will demonstrate understanding and implementation of aspiration precautions and swallow strategies independently over 2-4 session(s). In Progress   Goal #3 The patient will tolerate trial upgrade of soft/solid consistency and thin liquids without overt signs or symptoms of aspiration with 100 % accuracy over 1-2 session(s).   In Progress   Goal #4 Patient will participate in cognitive communication assessment     In Progress     FOLLOW UP  Follow Up Needed (Documentation Required): Yes  SLP Follow-up Date: 07/16/23  Number of Visits to Meet Established Goals: 5    Session: 1 of 5    If you have any questions, please contact Tiffanie Gonzalez [phone] [facility]  Pager [phone]

## 2023-07-14 NOTE — CM/SW NOTE
07/14/23 1100   CM/SW Referral Data   Referral Source Social Work (self-referral)   Reason for Referral Discharge planning   Informant EMR;Clinical Staff Member   Patient Info   Patient's Home Environment Subacute Rehab   Post Acute Care Provider Upon Admission [facility]   Discharge Needs   Anticipated D/C needs Subacute rehab       Pt discussed with CM who followed pt in unit. Pt typically resides at [facility], however has been receiving ADRIANA since 6/22/23. PT/OT eval recommends [facility] at [facility]. Referral sent to [facility], [facility] not needed as pt admits from facility. Pt will need insurance auth prior to dc. SW/CM to remain available.      ADDENDUM 2:00-- [address] [address] [phone]  and Carline Carmichael [phone] - good [facility] 7/18    [facility]  Discharge Planner  [id number]

## 2023-07-14 NOTE — PROGRESS NOTES
Stroke booklet given to patient; the following education was provided:     BEFAST - Stroke warning signs and symptoms  Personalized risk factors including HTN, DOAC use  Need for follow-up after discharge  How to activate EMS for stroke  Healthy lifestyle (nutrition and exercise)    Son present during education. Patient unable to participate in teachings, son receptive to new information. All pertinent questions and concerns were addressed. Patient discharge instructions (References/Attachments) updated.        Tamica Lyman RN, BSN  Stroke Navigator  [phone]

## 2023-07-14 NOTE — PLAN OF CARE
Notified by RN that pt had episodes of SVT. Pt was asymptomatic. Now HR in 50's, sinus bradycardia on monitor. Plan: check electrolyte  Since pt is SB now, monitor for now.

## 2023-07-14 NOTE — PLAN OF CARE
Assumed care at [address]. A&Ox4, RA, NSR on tele  Q4 Neuro, see flowsheets  No complaints of pain  Patient w/ intermittent episodes of SVT, Cardiology notified.    Potassium replaced    Call light within reach    Patient updated on plan of care Comfort Care / End of Life Orderset Tip Sheet   All in all, comfort focused care is anything and everything that brings comfort and satisfaction to the patient/family when time appears short.     Medications  Opioids can be given not only for pain but also for SOB/dyspnea. Please keep in mind that not everyone will need opioids frequently for s/s relief, but some certainly will, and it is important to stay vigilant to their needs. If you are noting that frequent doses of opioids are being used, please contact the ordering provider as dosing adjustments might be needed. This might include an opioid infusion to help gain better control of pain/dyspnea. When a basal drip is initiated, bolus doses may continue if needed, but upward titration of the basal dose should be considered if multiple PRN bolus doses are required.  Benzodiazepines like Lorazepam can be given for s/s of anxiety and agitation and is sometimes used for nausea/vomiting.   Antipsychotics like Haldol can be given for severe agitation/behaviors and is sometimes used for nausea/vomiting.   Anticholinergics like Glycopyrrolate and Scopolamine are useful for secretions that cause the \"death rattle\". Keep in mind that this sound not be a cause for alarm, as it is just the pooling of thick secretions in the back of the throat, due to a poor swallow reflex. It is the sound of air flowing through it, just like when you try blowing air through a piece of grass cupped in your hands. The sound can be anxiety provoking to the family, but you can help to ease the family by discussing it's benign presence, yet treat it with medication if necessary.  Scopolamine patch can take 6-8 hours before onset of action. Rubinul should work within minutes when given IV.     It is important to remember, that when providing comfort medications for s/s of distress in EOL, that when given appropriately and as directed, the medications are providing comfort and s/s relief, therefore  offering improved quality of life, even when remaining time might be short. We encourage the provision of comfort medications when symptomatically necessary. Many people have misconceptions regarding the role of EOL s/s management, but perhaps knowing this will help to guide your practice:     If s/s go unrecognized or poorly managed, they can lead to the cause and/or escalation of other s/s such as: pain -> dyspnea -> anxiety -> agitation-> falls, which could ultimately lead to a bad or sufferable death.     Please stay vigilant of your pt's s/s!    Mindfulness in Transitions from Medical Care to Comfort Care    Feeding/Intake: when someone transitions from a regimented dietary plan or a strict NPO status to a comfort diet \"allow to eat / drink for pleasure\", the patient/POA should have the understanding that when time is short, the risk for aspiration or ( __ ) no longer outweighs the benefit for holding dietary restrictions in place. What this means, is although time is appearing short, we are allowing for a natural death while optimizing quality of life. During that time, dietary desires/needs should no longer be restricted to focus on health, wellness, and safety. Things that were considered hazardous (for concerns of aspiration) or not allowed for various health reasons (diabetes, cardiac, renal) are now fully allowed without restriction for the final benefits for heightened quality of life (allowing the full benefit of taste, texture, temperature, flavor, with a normal consistency). Yet some patients prefer to stick to their usual diet and that is okay too. It's whatever they want to do. Eating is a simple pleasure and gives us all QOL, let them return to an unrestricted diet if desired.     Lab draws and glucose checks If intake is minimal or no longer occurring, the need for insulin injections or oral diabetic medications may no longer be necessary and therefore checking blood glucose would no longer  necessary. When at this stage we are no longer trying to keep someone in the healthy window for optimal glucose control. Furthermore, giving diabetic medications in the setting of poor PO intake could lead to induced hypoglycemia. The final days of life without pokes from lab draws and glucose checks can be freeing.     Vital signs and monitoring equipment In EOL cares, vital signs are no longer a vital or necessary need. Not to us and not to the patient. Aside from observing the RR, which can cue us in on unmanaged s/s (as well as body language or non-verbal signs), when focusing on comfort we will not be treating abnormal VS any longer. The blood pressure cuff is tight, annoying, and may cause discomfort. The beeping of the monitors and alarms may cause disturbed sleep and may lead to anxiety, agitation, and delirium. The leads and wires may feel like a tether or restraint. Please free your patients from these! Additionally, when someone is actively dying, the last thing that a family member needs to see or hear are the medical sounds and reminders that death is actively occurring. Allow them the opportunity to experience those final moments in peace (without the medical sounds). The family will let you know when the time is nearing and if you keep a close eye on your patient, you too, will  the signs suggesting that death is near.     Activity Allow the patient the ability to decide on how much or how little activity they desire to do, yet keeping safety in mind, but comfort at the front of the line.  Maintaining skin integrity with frequent turns becomes less of a priority when it causes pain or interrupts their peace, sleep, or visits with loved ones. When focusing on comfort, turning only becomes necessary when requested by the patient/family and no longer becomes a necessary regimented schedule.     Presence: You have the ability to help your patient/family navigate this journey. Having this knowledge  and sharing it with the patient and family will help to decrease anxiety and fear through the individual changes until death occurs. Thank you for being their guide.     ~The Palliative Care and Support Team~

## 2023-07-15 ENCOUNTER — APPOINTMENT (OUTPATIENT)
Dept: MRI IMAGING | Facility: HOSPITAL | Age: 83
End: 2023-07-15
Attending: INTERNAL MEDICINE
Payer: MEDICARE

## 2023-07-15 PROBLEM — I63.9 CEREBROVASCULAR ACCIDENT (CVA) (HCC): Status: ACTIVE | Noted: 2023-07-15

## 2023-07-15 LAB
ANION GAP SERPL CALC-SCNC: 6 MMOL/L (ref 0–18)
BUN BLD-MCNC: 18 MG/DL (ref 7–18)
CALCIUM BLD-MCNC: 8 MG/DL (ref 8.5–10.1)
CHLORIDE SERPL-SCNC: 111 MMOL/L (ref 98–112)
CHOLEST SERPL-MCNC: 144 MG/DL (ref ?–200)
CO2 SERPL-SCNC: 21 MMOL/L (ref 21–32)
CREAT BLD-MCNC: 0.63 MG/DL
GFR SERPLBLD BASED ON 1.73 SQ M-ARVRAT: 87 ML/MIN/1.73M2 (ref 60–?)
GLUCOSE BLD-MCNC: 111 MG/DL (ref 70–99)
GLUCOSE BLD-MCNC: 129 MG/DL (ref 70–99)
GLUCOSE BLD-MCNC: 147 MG/DL (ref 70–99)
GLUCOSE BLD-MCNC: 149 MG/DL (ref 70–99)
GLUCOSE BLD-MCNC: 173 MG/DL (ref 70–99)
HDLC SERPL-MCNC: 78 MG/DL (ref 40–59)
LDLC SERPL CALC-MCNC: 53 MG/DL (ref ?–100)
NONHDLC SERPL-MCNC: 66 MG/DL (ref ?–130)
OSMOLALITY SERPL CALC.SUM OF ELEC: 291 MOSM/KG (ref 275–295)
POTASSIUM SERPL-SCNC: 3.9 MMOL/L (ref 3.5–5.1)
SODIUM SERPL-SCNC: 138 MMOL/L (ref 136–145)
TRIGL SERPL-MCNC: 67 MG/DL (ref 30–149)
VLDLC SERPL CALC-MCNC: 10 MG/DL (ref 0–30)

## 2023-07-15 PROCEDURE — 99233 SBSQ HOSP IP/OBS HIGH 50: CPT | Performed by: OTHER

## 2023-07-15 PROCEDURE — 70551 MRI BRAIN STEM W/O DYE: CPT | Performed by: INTERNAL MEDICINE

## 2023-07-15 RX ORDER — LOSARTAN POTASSIUM 25 MG/1
25 TABLET ORAL DAILY
Status: DISCONTINUED | OUTPATIENT
Start: 2023-07-15 | End: 2023-07-18

## 2023-07-15 RX ORDER — AMIODARONE HYDROCHLORIDE 200 MG/1
200 TABLET ORAL 2 TIMES DAILY WITH MEALS
Status: DISCONTINUED | OUTPATIENT
Start: 2023-07-15 | End: 2023-07-18

## 2023-07-15 NOTE — SLP NOTE
SPEECH DAILY NOTE - INPATIENT    ASSESSMENT & PLAN   ASSESSMENT  Pt seen for dysphagia tx to assess tolerance with recommended diet, ensure proper utilization of aspiration precautions and provide pt/family education. Patient in bed with eyes closed but responsive. Better able to open her eyes today but difficulty with sustained eye opening. She regularly repeats \"I'm so tired\" throughout my visit. She does answer questions appropriately and follows commands. No apparent expressive or receptive language deficits. Her speech is dysarthric but 100% intelligible. She was agreeable to po trials. Given improved mental status, SLP proceeded with trials of solids and thin liquids to assess for upgrade in diet. Patient accepted ice chips, thin by tsp, thin by straw, solid trials and mildly thick liquids by straw. Patient with intact oral retrieval and containment with all trials aside from one ice chip trial with loss of bolus from left side of mouth. Patient did benefit from cueing to drink from straw but did well by independently self limiting to single sips. Intermittent increased oral hold time but able to swallow with reminders. Mastication of solid and ice chips a bit prolonged but adequate with complete oral clearance. Laryngeal excursion of adequate strength to palpation but variable timeliness. No overt signs of aspiration noted across all trials. Patient mental status improved this morning and appears to have periods where she is more alert but this is not consistent. She was very lethargic during my visit yesterday afternoon. Her dysphagia appears to be more related to mental status/alertness as she appears to manage fairly well overall considering facial weakness. I would like to see more consistent alertness before advancing her diet due to concern for aspiration when lethargic and to allow for more efficient po intake until she is more consistently alert. Discussed with RN.       Diet Recommendations - Solids: Puree (1:1 feeding assistance)  Diet Recommendations - Liquids: Nectar thick liquids/ Mildly thick (single sips by straw ok)    Compensatory Strategies Recommended: Slow rate;Small bites and sips (cue to swallow as needed)  Aspiration Precautions: Upright position; Slow rate;Small bites and sips  Medication Administration Recommendations: One pill at a time; Whole in puree;Crushed in puree (as tolerated)    Patient Experiencing Pain: No                Discharge Recommendations  Discharge Recommendations/Plan: Undetermined    Treatment Plan  Treatment Plan/Recommendations: Dysphagia therapy;Communication evaluation    Interdisciplinary Communication: Discussed with RN            GOALS  Goal #1 The patient will tolerate puree consistency and mildly thick liquids without overt signs or symptoms of aspiration with 100 % accuracy over 2-4 session(s). In Progress   Goal #2 The patient/family/caregiver will demonstrate understanding and implementation of aspiration precautions and swallow strategies independently over 2-4 session(s). In Progress   Goal #3 The patient will tolerate trial upgrade of soft/solid consistency and thin liquids without overt signs or symptoms of aspiration with 100 % accuracy over 1-2 session(s).   In Progress   Goal #4 Patient will participate in cognitive communication assessment     In Progress     FOLLOW UP  Follow Up Needed (Documentation Required): Yes  SLP Follow-up Date: 07/17/23  Number of Visits to Meet Established Goals: 5    Session: 2 of 5    If you have any questions, please contact Gerald Lopez [address] [phone] [facility]  Pager [phone]

## 2023-07-15 NOTE — PLAN OF CARE
Assumed pt care at 0730  A&Ox2, w/ some forgetfulness and confusion  Voiding per marilyn  Had Bmx1  Son at bedside  MRI done  Neuro checks Q4hrs; no new decifits  Pureed diet, nectar thick, may use straw one sip at a time  30 beats of SVT around 1600.  Cards notified, added amio 200mg BID  BP elevated this AM, Labetalol given once, see MAR  Call light in reach  Bed in low position

## 2023-07-15 NOTE — PLAN OF CARE
Assumed care of patient at 0730  A&ox1, room air, VSS.  NSR/SB on tele; 2 episodes of sustained SVT. Mds notified. No new orders. Q4 neuro checks; see flow sheets. No new deficits  No complaints of pian. Electrolytes replaced. Echo completed. EEG attempted, but patient did not tolerate. Neuro notified. Okay to discontinue order. Bed alarm on, call light in reach, will continue to monitor. Family and patient updated on plan of care. Plan for MRI; PRN ativan ordered prior to scan. Problem: Patient/Family Goals  Goal: Patient/Family Long Term Goal  Description: Patient's Long Term Goal: go home    Interventions:  - MRI  - EEG  - PT/OT/ST  - See additional Care Plan goals for specific interventions  Outcome: Progressing  Goal: Patient/Family Short Term Goal  Description: Patient's Short Term Goal: reorient    Interventions:   - reorient as needed  - family at bedside   - See additional Care Plan goals for specific interventions  Outcome: Progressing     Problem: NEUROLOGICAL - ADULT  Goal: Achieves stable or improved neurological status  Description: INTERVENTIONS  - Assess for and report changes in neurological status  - Initiate measures to prevent increased intracranial pressure  - Maintain blood pressure and fluid volume within ordered parameters to optimize cerebral perfusion and minimize risk of hemorrhage  - Monitor temperature, glucose, and sodium.  Initiate appropriate interventions as ordered  Outcome: Progressing  Goal: Absence of seizures  Description: INTERVENTIONS  - Monitor for seizure activity  - Administer anti-seizure medications as ordered  - Monitor neurological status  Outcome: Progressing  Goal: Remains free of injury related to seizure activity  Description: INTERVENTIONS:  - Maintain airway, patient safety  and administer oxygen as ordered  - Monitor patient for seizure activity, document and report duration and description of seizure to MD/LIP  - If seizure occurs, turn patient to side and suction secretions as needed  - Reorient patient post seizure  - Seizure pads on all 4 side rails  - Instruct patient/family to notify RN of any seizure activity  - Instruct patient/family to call for assistance with activity based on assessment  Outcome: Progressing  Goal: Achieves maximal functionality and self care  Description: INTERVENTIONS  - Monitor swallowing and airway patency with patient fatigue and changes in neurological status  - Encourage and assist patient to increase activity and self care with guidance from PT/OT  - Encourage visually impaired, hearing impaired and aphasic patients to use assistive/communication devices  Outcome: Progressing     Problem: SAFETY ADULT - FALL  Goal: Free from fall injury  Description: INTERVENTIONS:  - Assess pt frequently for physical needs  - Identify cognitive and physical deficits and behaviors that affect risk of falls.   - [place] fall precautions as indicated by assessment.  - Educate pt/family on patient safety including physical limitations  - Instruct pt to call for assistance with activity based on assessment  - Modify environment to reduce risk of injury  - Provide assistive devices as appropriate  - Consider OT/PT consult to assist with strengthening/mobility  - Encourage toileting schedule  Outcome: Progressing     Problem: GASTROINTESTINAL - ADULT  Goal: Maintains adequate nutritional intake (undernourished)  Description: INTERVENTIONS:  - Monitor percentage of each meal consumed  - Identify factors contributing to decreased intake, treat as appropriate  - Assist with meals as needed  - Monitor I&O, WT and lab values  - Obtain nutritional consult as needed  - Optimize oral hygiene and moisture  - Encourage food from home; allow for food preferences  - Enhance eating environment  Outcome: Progressing     Problem: Impaired Swallowing  Goal: Minimize aspiration risk  Description: Interventions:  - Patient should be alert and upright for all feedings (90 degrees preferred)  - Offer food and liquids at a slow rate  - No straws  - Encourage small bites of food and small sips of liquid  - Offer pills one at a time, crush or deliver with applesauce as needed  - Discontinue feeding and notify MD (or speech pathologist) if coughing or persistent throat clearing or wet/gurgly vocal quality is noted  Outcome: Progressing

## 2023-07-16 LAB
ANION GAP SERPL CALC-SCNC: 5 MMOL/L (ref 0–18)
BUN BLD-MCNC: 19 MG/DL (ref 7–18)
CALCIUM BLD-MCNC: 8.8 MG/DL (ref 8.5–10.1)
CHLORIDE SERPL-SCNC: 110 MMOL/L (ref 98–112)
CO2 SERPL-SCNC: 25 MMOL/L (ref 21–32)
CREAT BLD-MCNC: 0.93 MG/DL
GFR SERPLBLD BASED ON 1.73 SQ M-ARVRAT: 61 ML/MIN/1.73M2 (ref 60–?)
GLUCOSE BLD-MCNC: 128 MG/DL (ref 70–99)
GLUCOSE BLD-MCNC: 128 MG/DL (ref 70–99)
GLUCOSE BLD-MCNC: 132 MG/DL (ref 70–99)
GLUCOSE BLD-MCNC: 132 MG/DL (ref 70–99)
GLUCOSE BLD-MCNC: 146 MG/DL (ref 70–99)
OSMOLALITY SERPL CALC.SUM OF ELEC: 294 MOSM/KG (ref 275–295)
POTASSIUM SERPL-SCNC: 4.2 MMOL/L (ref 3.5–5.1)
SODIUM SERPL-SCNC: 140 MMOL/L (ref 136–145)

## 2023-07-16 PROCEDURE — 99233 SBSQ HOSP IP/OBS HIGH 50: CPT | Performed by: OTHER

## 2023-07-16 NOTE — PROGRESS NOTES
07/16/23 1548   Clinical Encounter Type   Visited With Family   Crisis Visit   (Pt. situation is vulnerable due to stroke.)   Referral From Family   Referral To    Sacramental Encounters   Sacrament of Sick-Anointing Family requested anointing   Family Spiritual Encounters   Family Coping Accepting   Taxonomy   Intended Effects Build relationship of care and support   Methods Offer emotional support;Exploring hope;Explore nature of God   Interventions [place] spiritual/Sabianist resources; Share written prayer; Share words of hope and inspiration;Communicate patient's needs/concerns to others      provided listening session with family to discern their wishes for Enzo Erazo. Family would like her anointed to provided curative power or end of life (last rites) should it come to that. Julio César Combs was discussed at length, with all sharing examples.  suggested that we call  Monday morning to do the anointing, and when an ETA is given, that  call POA son Isabelle Aldridge at [phone] as he would very much like to be present for the anointing.

## 2023-07-16 NOTE — PROGRESS NOTES
MRI reviewed. No evident underlying lesion. Hemorrhage likely consistent with amyloid angiopathy given MRI findings. Ventricle size stable. Follow up in 2 weeks with a repeat head CT as scheduled.     Please call with any questions

## 2023-07-16 NOTE — PROGRESS NOTES
07/16/23 1846   Clinical Encounter Type   Visited With Patient and family together   Routine Visit Follow-up   Referral To    Congregational Encounters   Spiritual Requests During Visit / Hospitalization Sacrament of the Sick   Taxonomy   Intended Effects Aligning care plan with patient's values   Methods Assist with spiritual/Spiritism practices      stopped by because of request for sacrament of the sick. I checked with family members and they indicated that they had spoken with a  earlier today and he had told them that the  on duty on Monday would contact a  to come by to do the anointing for their mother. I told them that I would also convey that message to the staff  in the morning.

## 2023-07-16 NOTE — PLAN OF CARE
Assumed care at [address]. AOX1/2. Forgetful. Lethargic. RA. VSS. NSR on tele. Assessment complete. No acute distress noted. No c/o pain at this time. Neuro check q4h. No new deficits present. Seizure precautions maintained. Safety precautions in place. Bed lowered and locked. Call light in reach. All questions and concerns addressed. Cont with current POC.

## 2023-07-16 NOTE — PHYSICAL THERAPY NOTE
PHYSICAL THERAPY TREATMENT NOTE - INPATIENT    Room Number: 2075/1304-P     Session: 1     Number of Visits to Meet Established Goals: 5    Presenting Problem: non traumatic R thalamic hemorrhage  Co-Morbidities : hypertension, hypothyroidism chronic anxiety depression history of breast cancer 2019 status postlumpectomy radiation    History related to current admission: Patient is a 79year old female admitted on 7/12/2023 presented from [facility] with L facial drop and L hemiparesis, \"CT demonstrated a right thalamic hemorrhage with intraventricular extension\", no acute neuro surg intervention     ASSESSMENT     Pt participating in therapy session. Pt attempting to follow increased commands this session. Pt continues to demonstrate significant extensor tone L UE and L LE. Pt with poor sitting balance, attempting to correct with cueing. Pt with inattention to left side. Not appropriate to attempt standing this session. Pt is two person max assist for bed mobility. Recommend total lift for nsg transfers. Pt will continue to benefit from ip skilled PT to address deficits and achieve max level of mobility. Continue to recommend ADRIANA upon dc. RN aware of session. DISCHARGE RECOMMENDATIONS  PT Discharge Recommendations: Sub-acute rehabilitation     PLAN  PT Treatment Plan: Bed mobility; Body mechanics; Coordination; Endurance; Energy conservation;Patient education; Family education;Gait training;Range of motion; Neuromuscular re-educate;Strengthening;Transfer training;Balance training  Rehab Potential : Good  Frequency (Obs): 3-5x/week      CURRENT GOALS     Goal #1 Patient is able to demonstrate supine - sit EOB @ level: moderate assistance      Goal #2 Patient is able to demonstrate transfers EOB to/from Chair/Wheelchair at assistance level: moderate assistance      Goal #3 Patient is able to ambulate 10 feet with assist device: walker - rolling at assistance level: maximum assistance      Goal #4     Goal #5     Goal #6 Goal Comments: Goals established on 2023 all goals ongoing      SUBJECTIVE  \"I'm hot\" Pt with soft voice and repetitive talking throughout session    OBJECTIVE  Precautions: Bed/chair alarm (-150)    WEIGHT BEARING RESTRICTION  Weight Bearing Restriction: None                PAIN ASSESSMENT   Ratin          BALANCE                                                                                                                       Static Sitting: Poor  Dynamic Sitting: Dependent           Static Standing: Not tested  Dynamic Standing: Not tested    ACTIVITY TOLERANCE                         O2 WALK         AM-PAC '6-Clicks' INPATIENT SHORT FORM - BASIC MOBILITY  How much difficulty does the patient currently have. .. Patient Difficulty: Turning over in bed (including adjusting bedclothes, sheets and blankets)?: A Lot   Patient Difficulty: Sitting down on and standing up from a chair with arms (e.g., wheelchair, bedside commode, etc.): Unable   Patient Difficulty: Moving from lying on back to sitting on the side of the bed?: A Lot   How much help from another person does the patient currently need. .. Help from Another: Moving to and from a bed to a chair (including a wheelchair)?: Total   Help from Another: Need to walk in hospital room?: Total   Help from Another: Climbing 3-5 steps with a railing?: Total       AM-PAC Score:  Raw Score: 8   Approx Degree of Impairment: 86.62%   Standardized Score (AM-PAC Scale): 28.58   CMS Modifier (G-Code): CM    FUNCTIONAL ABILITY STATUS  Gait Assessment   Functional Mobility/Gait Assessment  Gait Assistance: Not tested  Distance (ft): 0    Skilled Therapy Provided    Bed Mobility:  Rolling: max assist   Supine<>Sit: max assist of 2   Sit<>Supine: max assist of 2     Transfer Mobility: NT      Therapist's Comments: Pt presents semi-reclined in bed, agreeable to PT. RN approval for session noted. Pt mobility as above.  Pt with posterior and right lateral lean in sitting, requires mod/max assist to maintain sitting balance, attempts to correct after repeated cueing to \"reach for toes. \" Pt completes sitting dynamic and static balance activities. Encouraged attention to left side throughout. Pt participates in ROM ex in sitting with R LE with repeated cueing. Pt L LE remains in ext in both sitting and supine, unable to achieve flexion with neuro techniques of tapping and rotation. Completed session with co-treat with OT, requiring two skilled therapist for sitting activity. Patient End of Session: In bed;Needs met;Call light within reach;RN aware of session/findings; All patient questions and concerns addressed; Alarm set; Family present    PT Session Time: 25 minutes  Gait Trainin minutes  Therapeutic Activity: 20 minutes  Therapeutic Exercise: 5 minutes

## 2023-07-16 NOTE — PLAN OF CARE
Assumed pt care at 0730  A&Ox1-2  Pt worked w/ PT/OT, able to sit at the edge of the bed w/ 2x max assist  Neuro checks Q4. No new deficits  Appetite improving  Hydralazine PRN x1 given, see MAR  Son at bedside, questions answered. Son requested for \"The last rites\" and wanted to be notified the time Luisana Story will be here.   is aware and he will let their AM group know  Call light in reach  Bed in low position

## 2023-07-17 LAB
ANION GAP SERPL CALC-SCNC: 4 MMOL/L (ref 0–18)
BUN BLD-MCNC: 19 MG/DL (ref 7–18)
CALCIUM BLD-MCNC: 9.1 MG/DL (ref 8.5–10.1)
CHLORIDE SERPL-SCNC: 108 MMOL/L (ref 98–112)
CO2 SERPL-SCNC: 23 MMOL/L (ref 21–32)
CREAT BLD-MCNC: 0.84 MG/DL
EST. AVERAGE GLUCOSE BLD GHB EST-MCNC: 146 MG/DL (ref 68–126)
GFR SERPLBLD BASED ON 1.73 SQ M-ARVRAT: 68 ML/MIN/1.73M2 (ref 60–?)
GLUCOSE BLD-MCNC: 127 MG/DL (ref 70–99)
GLUCOSE BLD-MCNC: 133 MG/DL (ref 70–99)
GLUCOSE BLD-MCNC: 134 MG/DL (ref 70–99)
GLUCOSE BLD-MCNC: 152 MG/DL (ref 70–99)
GLUCOSE BLD-MCNC: 166 MG/DL (ref 70–99)
HBA1C MFR BLD: 6.7 % (ref ?–5.7)
OSMOLALITY SERPL CALC.SUM OF ELEC: 284 MOSM/KG (ref 275–295)
POTASSIUM SERPL-SCNC: 4.2 MMOL/L (ref 3.5–5.1)
SODIUM SERPL-SCNC: 135 MMOL/L (ref 136–145)

## 2023-07-17 PROCEDURE — 99232 SBSQ HOSP IP/OBS MODERATE 35: CPT | Performed by: NURSE PRACTITIONER

## 2023-07-17 RX ORDER — METOPROLOL TARTRATE 50 MG/1
50 TABLET, FILM COATED ORAL
Status: DISCONTINUED | OUTPATIENT
Start: 2023-07-17 | End: 2023-07-18

## 2023-07-17 RX ORDER — SODIUM CHLORIDE 9 MG/ML
INJECTION, SOLUTION INTRAVENOUS ONCE
Status: COMPLETED | OUTPATIENT
Start: 2023-07-17 | End: 2023-07-17

## 2023-07-17 NOTE — SPIRITUAL CARE NOTE
Shannon Salazar will arrive around 3pm 7/17/23 to complete anointing.      [facility]

## 2023-07-17 NOTE — PLAN OF CARE
Assumed care at [address].    A&Ox2, RA, NSR on tele  Q4 Neuro, no new deficits   No complaints of pain  Voiding per purewick  Bladder scan > 600, straight cath x1   Call light within reach    Patient updated on plan of care

## 2023-07-17 NOTE — PROGRESS NOTES
07/17/23 0844   Clinical Encounter Type   Referral From    Referral To    Sacramental Encounters   Sacrament of Sick-Anointing Family requested anointing      contacted Suzanne Lopez and [facility] for anointing of the sick. Waiting on  to return call.       [address]

## 2023-07-17 NOTE — PROGRESS NOTES
07/17/23 1430   Clinical Encounter Type   Visited With Health care provider   Routine Visit Follow-up     This  spoke with Eric Ramos. Confirmed over the phone that he will anoint the patient as scheduled at 3pm today.

## 2023-07-17 NOTE — PROGRESS NOTES
attempted to call the patient's son Dani Burleson twice the telephone did not ring.  left a voice mail message on Ashwin's (son) phone at 8:55 AM.  Explaining the  will arrive this afternoon.      [address]

## 2023-07-17 NOTE — PROGRESS NOTES
Patient alert x2-3. Very drowsy on shift. MD aware. Presumed related to anti-seizure meds and neurological process. Wakes up and answers questions and follows simple commands. Continues with urine retention. Decreased urine production on shift on multiple bladder scans but never over 400 in bladder so no straight cath on shift. MD aware. 0.9% ordered for 1L at 75/hr to ensure proper hydration and stimulate urine production. Continue close monitor labs. Neuros q4. Turn q2. Left side hemiplegia noted on neuro exam. Waffle cushion placed under bottom for comfort and to prevent pressure point. Updated patient and family on plan of care and condition update. All needs met on shift.

## 2023-07-17 NOTE — PROGRESS NOTES
07/17/23 1105   Clinical Encounter Type   Visited With Family      spoke with the patient's son by telephone. Anna Kaur will arrive at 3pm today.

## 2023-07-18 ENCOUNTER — TELEPHONE (OUTPATIENT)
Dept: NEUROLOGY | Facility: CLINIC | Age: 83
End: 2023-07-18

## 2023-07-18 ENCOUNTER — TELEPHONE (OUTPATIENT)
Dept: SURGERY | Facility: CLINIC | Age: 83
End: 2023-07-18

## 2023-07-18 VITALS
RESPIRATION RATE: 24 BRPM | SYSTOLIC BLOOD PRESSURE: 153 MMHG | HEART RATE: 75 BPM | HEIGHT: 65 IN | WEIGHT: 119.38 LBS | BODY MASS INDEX: 19.89 KG/M2 | DIASTOLIC BLOOD PRESSURE: 63 MMHG | OXYGEN SATURATION: 97 % | TEMPERATURE: 98 F

## 2023-07-18 LAB
ANION GAP SERPL CALC-SCNC: 6 MMOL/L (ref 0–18)
BASOPHILS # BLD AUTO: 0.07 X10(3) UL (ref 0–0.2)
BASOPHILS NFR BLD AUTO: 0.5 %
BUN BLD-MCNC: 20 MG/DL (ref 7–18)
CALCIUM BLD-MCNC: 8.7 MG/DL (ref 8.5–10.1)
CHLORIDE SERPL-SCNC: 109 MMOL/L (ref 98–112)
CO2 SERPL-SCNC: 23 MMOL/L (ref 21–32)
CREAT BLD-MCNC: 0.89 MG/DL
EOSINOPHIL # BLD AUTO: 0.04 X10(3) UL (ref 0–0.7)
EOSINOPHIL NFR BLD AUTO: 0.3 %
ERYTHROCYTE [DISTWIDTH] IN BLOOD BY AUTOMATED COUNT: 15.9 %
GFR SERPLBLD BASED ON 1.73 SQ M-ARVRAT: 64 ML/MIN/1.73M2 (ref 60–?)
GLUCOSE BLD-MCNC: 121 MG/DL (ref 70–99)
GLUCOSE BLD-MCNC: 129 MG/DL (ref 70–99)
GLUCOSE BLD-MCNC: 136 MG/DL (ref 70–99)
GLUCOSE BLD-MCNC: 138 MG/DL (ref 70–99)
GLUCOSE BLD-MCNC: 315 MG/DL (ref 70–99)
HCT VFR BLD AUTO: 41.8 %
HGB BLD-MCNC: 13.5 G/DL
IMM GRANULOCYTES # BLD AUTO: 0.07 X10(3) UL (ref 0–1)
IMM GRANULOCYTES NFR BLD: 0.5 %
LYMPHOCYTES # BLD AUTO: 2.21 X10(3) UL (ref 1–4)
LYMPHOCYTES NFR BLD AUTO: 16.1 %
MAGNESIUM SERPL-MCNC: 2.3 MG/DL (ref 1.6–2.6)
MCH RBC QN AUTO: 28.5 PG (ref 26–34)
MCHC RBC AUTO-ENTMCNC: 32.3 G/DL (ref 31–37)
MCV RBC AUTO: 88.2 FL
MONOCYTES # BLD AUTO: 1.47 X10(3) UL (ref 0.1–1)
MONOCYTES NFR BLD AUTO: 10.7 %
NEUTROPHILS # BLD AUTO: 9.84 X10 (3) UL (ref 1.5–7.7)
NEUTROPHILS # BLD AUTO: 9.84 X10(3) UL (ref 1.5–7.7)
NEUTROPHILS NFR BLD AUTO: 71.9 %
OSMOLALITY SERPL CALC.SUM OF ELEC: 291 MOSM/KG (ref 275–295)
PLATELET # BLD AUTO: 200 10(3)UL (ref 150–450)
POTASSIUM SERPL-SCNC: 4 MMOL/L (ref 3.5–5.1)
RBC # BLD AUTO: 4.74 X10(6)UL
SODIUM SERPL-SCNC: 138 MMOL/L (ref 136–145)
WBC # BLD AUTO: 13.7 X10(3) UL (ref 4–11)

## 2023-07-18 RX ORDER — ACETAMINOPHEN 325 MG/1
650 TABLET ORAL EVERY 4 HOURS PRN
Refills: 0 | Status: SHIPPED | COMMUNITY
Start: 2023-07-18

## 2023-07-18 RX ORDER — METOPROLOL TARTRATE 50 MG/1
50 TABLET, FILM COATED ORAL
Qty: 60 TABLET | Refills: 3 | Status: SHIPPED | OUTPATIENT
Start: 2023-07-18

## 2023-07-18 RX ORDER — LOSARTAN POTASSIUM 25 MG/1
25 TABLET ORAL DAILY
Qty: 30 TABLET | Refills: 11 | Status: SHIPPED | OUTPATIENT
Start: 2023-07-19 | End: 2023-07-18

## 2023-07-18 RX ORDER — LEVETIRACETAM 100 MG/ML
500 SOLUTION ORAL 2 TIMES DAILY
Status: DISCONTINUED | OUTPATIENT
Start: 2023-07-18 | End: 2023-07-18

## 2023-07-18 RX ORDER — LEVETIRACETAM 100 MG/ML
500 SOLUTION ORAL 2 TIMES DAILY
Qty: 300 ML | Refills: 2 | Status: SHIPPED | OUTPATIENT
Start: 2023-07-18

## 2023-07-18 RX ORDER — AMIODARONE HYDROCHLORIDE 200 MG/1
200 TABLET ORAL DAILY
Qty: 30 TABLET | Refills: 3 | Status: SHIPPED | OUTPATIENT
Start: 2023-07-18

## 2023-07-18 RX ORDER — ENOXAPARIN SODIUM 100 MG/ML
40 INJECTION SUBCUTANEOUS DAILY
Refills: 0 | Status: SHIPPED | COMMUNITY
Start: 2023-07-19

## 2023-07-18 RX ORDER — LOSARTAN POTASSIUM 25 MG/1
25 TABLET ORAL DAILY
Qty: 30 TABLET | Refills: 11 | Status: SHIPPED | OUTPATIENT
Start: 2023-07-19

## 2023-07-18 NOTE — CM/SW NOTE
CHELSEY met with pt son, Sarah Jacobs, outside of pt room. Reviewed DC plans-- insurance auth approved until today for [facility]. If pt is not medically cleared today, then Annemarie to re submit. Pt to have a longer [facility] stay, would likely transition to LTC. Notified liaison, Satnam Harman-- will call son to address questions. SW to remain available.      AUBREY Ahuja  Discharge Planner  [id number]

## 2023-07-18 NOTE — TELEPHONE ENCOUNTER
Last note w/ Dr. Mason Heredia 7.16.23:  Eddy Carter consistent with amyloid angiopathy given MRI findings. Ventricle size stable. Follow up in 2 weeks with a repeat head CT as scheduled. \"    Per note, pt would need CT scan prior to next appt. Called daughter in law to inform. Pt to reschedule CT to earlier date, will call back to see if a video visit is possible d/t patient needing transportation to appts.

## 2023-07-18 NOTE — PLAN OF CARE
Assumed care at [address].    A&Ox1-2, RA, NSR on tele  No complaints of pain  Q4 neuro, no new deficits  Call light within reach    Patient updated on plan of care

## 2023-07-18 NOTE — TELEPHONE ENCOUNTER
Patient left VM. I will be closing clinic 7/28 Friday. Called to reschedule patient to next Monday, 7/31. Limited VM left with primary contact, daughter in law.

## 2023-07-18 NOTE — CM/SW NOTE
07/18/23 1400   Discharge disposition   Expected discharge disposition subacute   Post Acute Care Provider [facility]   Discharge transportation [facility]     Pt cleared for dc to [employer].  set up amb transport for 6:30 PM, PCS completed. RN to call [phone] for report.      AUBREY Trujillo  Discharge Planner  [id number]

## 2023-07-18 NOTE — TELEPHONE ENCOUNTER
Pt's daughter-in-law Betzaida Sylvain would like to know if patient can not get the CT scan done before 7/31/2023 appt,would appt need to be r/s or pushed back until pt has CT scan done. Betzaida Narayan is requesting a call back to advise.

## 2023-07-18 NOTE — PHYSICAL THERAPY NOTE
PHYSICAL THERAPY TREATMENT NOTE - INPATIENT    Room Number: 9812/4622-V     Session: 2    Number of Visits to Meet Established Goals: 5    Presenting Problem: non traumatic R thalamic hemorrhage  Co-Morbidities : hypertension, hypothyroidism chronic anxiety depression history of breast cancer 2019 status postlumpectomy radiation    History related to current admission: Patient is a 79year old female admitted on 7/12/2023 presented from [facility] with L facial drop and L hemiparesis, \"CT demonstrated a right thalamic hemorrhage with intraventricular extension\", no acute neuro surg intervention     ASSESSMENT     Pt continues to present with increased extensor tone LUE, LLE. Pt drowsy, however able to answer some questions and follow some commands. Pt present with impaired strength , decreased endurance and somnolence. Pt will continue to benefit from ongoing skilled therapy to maximize functional independence. DISCHARGE RECOMMENDATIONS  PT Discharge Recommendations: Sub-acute rehabilitation     PLAN  PT Treatment Plan: Bed mobility; Body mechanics; Coordination; Endurance; Energy conservation;Patient education; Family education;Gait training;Range of motion; Neuromuscular re-educate;Strengthening;Transfer training;Balance training  Rehab Potential : Good  Frequency (Obs): 3-5x/week      CURRENT GOALS     Goal #1 Patient is able to demonstrate supine - sit EOB @ level: moderate assistance      Goal #2 Patient is able to demonstrate transfers EOB to/from Chair/Wheelchair at assistance level: moderate assistance      Goal #3 Patient is able to ambulate 10 feet with assist device: walker - rolling at assistance level: maximum assistance      Goal #4     Goal #5     Goal #6     Goal Comments: Goals established on 7/13/2023 7/18/2023 all goals ongoing      SUBJECTIVE  \"I'm so tired\"     OBJECTIVE  Precautions: Bed/chair alarm (-150)    WEIGHT BEARING RESTRICTION  Weight Bearing Restriction: None PAIN ASSESSMENT   Ratin          BALANCE                                                                                                                       Static Sitting: Poor -  Dynamic Sitting: Not tested           Static Standing: Not applicable  Dynamic Standing: Not applicable    ACTIVITY TOLERANCE                         O2 WALK         AM-PAC '6-Clicks' INPATIENT SHORT FORM - BASIC MOBILITY  How much difficulty does the patient currently have. .. Patient Difficulty: Turning over in bed (including adjusting bedclothes, sheets and blankets)?: Unable   Patient Difficulty: Sitting down on and standing up from a chair with arms (e.g., wheelchair, bedside commode, etc.): Unable   Patient Difficulty: Moving from lying on back to sitting on the side of the bed?: Unable   How much help from another person does the patient currently need. .. Help from Another: Moving to and from a bed to a chair (including a wheelchair)?: Total   Help from Another: Need to walk in hospital room?: Total   Help from Another: Climbing 3-5 steps with a railing?: Total       AM-PAC Score:  Raw Score: 6   Approx Degree of Impairment: 100%   Standardized Score (AM-PAC Scale): 23.55   CMS Modifier (G-Code): CN    FUNCTIONAL ABILITY STATUS  Gait Assessment   Functional Mobility/Gait Assessment  Gait Assistance: Not tested  Distance (ft): 0    Skilled Therapy Provided  Pt presents in semi sup, daughter, Savchristian Violet arriving and attempting to rouse pt . Pt cued for eyes open and sternal rub applied, pt becoming more alert, however, keeping eyes closed. Pt t/f to EOB c max A x 2 however, unable to obtain seated balance d/t ext tone. Pt placed in chair position for PROM LLE LUE - PNF patterns and for AAROM RUE RLE in all planes. Pt able to squeeze R hand on command and assist c RUE shoulder ext.      Bed Mobility:  Rolling: max assist   Supine<>Sit: max assist of 2   Sit<>Supine: max assist of 2     Transfer Mobility: NT            Patient End of Session: In bed;Needs met;Call light within reach;RN aware of session/findings; All patient questions and concerns addressed; Alarm set    PT Session Time: 25 minutes  Gait Trainin minutes  Therapeutic Activity: 20 minutes  Therapeutic Exercise: 5 minutes

## 2023-07-18 NOTE — PLAN OF CARE
Assumed care at 0700. Pt A/O x2-3. RA. SB on tele. VSS. Neuro checks q4, see flow sheets. Bladder scan done at 1200, 200ml. Dr. Stacie Griffith aware of bladder scan results. All consults cleared for discharge. Family at the bedside. Discharge instructions reviewed with family. IV removed. Tele removed. Report called to receiving nurse at [facility] ([facility] Pt discharged to [facility] via ambulance.

## 2023-07-19 ENCOUNTER — PATIENT OUTREACH (OUTPATIENT)
Dept: CASE MANAGEMENT | Age: 83
End: 2023-07-19

## 2023-07-19 NOTE — PROGRESS NOTES
Responding to patient's voicemail. (Dc 7/18)    Melody San MD  Specialty: Cardiovascular Diseases, CARDIOLOGY  call to make an appointment after discharged from rehab  [facility] [address]    Nikki Barrett MD  Specialty: Geriatrics  [facility] [id number] [phone]    No answer, left a voicemail with callback information.     Closing encounter

## 2023-07-19 NOTE — PROGRESS NOTES
August Ledbetter from [address] [place] [address] requesting confirmation for apts for pt     Please contact August Ledbetter and assist

## 2023-07-20 ENCOUNTER — PATIENT OUTREACH (OUTPATIENT)
Dept: CASE MANAGEMENT | Age: 83
End: 2023-07-20

## 2023-07-20 NOTE — PROGRESS NOTES
1st attempt Neuro apt request    Amelie Bowers, [address]  [phone]  Apt made for Mon.  Aug. 28th @2:35pm    Patients DIL Jasmyn Cables notified

## 2023-07-27 ENCOUNTER — HOSPITAL ENCOUNTER (OUTPATIENT)
Dept: CT IMAGING | Facility: HOSPITAL | Age: 83
Discharge: HOME | End: 2023-07-27
Attending: PHYSICIAN ASSISTANT
Payer: MEDICARE

## 2023-07-27 DIAGNOSIS — I62.9 INTRACRANIAL HEMORRHAGE (HCC): ICD-10-CM

## 2023-07-27 PROCEDURE — 70450 CT HEAD/BRAIN W/O DYE: CPT | Performed by: PHYSICIAN ASSISTANT

## 2023-07-28 ENCOUNTER — TELEPHONE (OUTPATIENT)
Dept: SURGERY | Facility: CLINIC | Age: 83
End: 2023-07-28

## 2023-07-28 NOTE — TELEPHONE ENCOUNTER
Pt daughter in law called to see if visit scheduled 07/31/23 could be changed to video visit; Advised the patient will be notified if video visit authorized.

## 2023-07-28 NOTE — TELEPHONE ENCOUNTER
Noted the patient and patient family request listed below.      Routed to the providers for review and feedback

## 2023-07-31 ENCOUNTER — TELEPHONE (OUTPATIENT)
Dept: NEUROLOGY | Facility: CLINIC | Age: 83
End: 2023-07-31

## 2023-07-31 ENCOUNTER — OFFICE VISIT (OUTPATIENT)
Dept: SURGERY | Facility: CLINIC | Age: 83
End: 2023-07-31
Payer: MEDICARE

## 2023-07-31 VITALS — BODY MASS INDEX: 19.83 KG/M2 | HEIGHT: 65 IN | WEIGHT: 119 LBS

## 2023-07-31 DIAGNOSIS — I62.9 INTRACRANIAL HEMORRHAGE (HCC): Primary | ICD-10-CM

## 2023-07-31 PROBLEM — M85.80 OSTEOPENIA: Status: ACTIVE | Noted: 2021-06-21

## 2023-07-31 PROCEDURE — 3008F BODY MASS INDEX DOCD: CPT | Performed by: PHYSICIAN ASSISTANT

## 2023-07-31 PROCEDURE — 1160F RVW MEDS BY RX/DR IN RCRD: CPT | Performed by: PHYSICIAN ASSISTANT

## 2023-07-31 PROCEDURE — 99213 OFFICE O/P EST LOW 20 MIN: CPT | Performed by: PHYSICIAN ASSISTANT

## 2023-07-31 PROCEDURE — 1159F MED LIST DOCD IN RCRD: CPT | Performed by: PHYSICIAN ASSISTANT

## 2023-07-31 NOTE — TELEPHONE ENCOUNTER
Provider was out of office last week when message received. Her appointment is this afternoon. Discussed with family, patient is here at the hospital for her appt. We will see her in the office.

## 2023-07-31 NOTE — TELEPHONE ENCOUNTER
Patient's daughter-in-law called, per request at office visit. No answer. Left limited voicemail to please return call. I discussed this patient with Dr. Kayy Flores. Given prior PE and left hemiparesis with limited mobility, okay to continue Lovenox for prophylaxis, as the patient has been on. She has had improvement in her IPH, as outlined per recent CT. We will monitor closely with another 2-week follow-up and repeat head CT. Please advise the patient's daughter-in-law if she returns the call.

## 2023-07-31 NOTE — TELEPHONE ENCOUNTER
Received call from the pt Daughter- in - Law. Noted the providers feedback listed below. Informed pt daughter in law as listed below.      Pt daughter in law acknowledged and is appreciative of the feedback

## 2023-07-31 NOTE — PROGRESS NOTES
Pt here for a hospital follow up on Nontraumatic thalamic hemorrhage. Son states she has been sleeping a lot, states she not talking much.        Imaging in the chart

## 2023-07-31 NOTE — PROGRESS NOTES
Patient: Jessica Lopez  Medical Record Number: [id number]  YOB: 1940  PCP: Mikhail Dukes MD    Reason for visit: Right thalamic IPH, hospital follow up    HISTORY OF CHIEF COMPLAINT:    Jessica Lopez is a very pleasant 79year old female who presents today for: Right thalamic IPH, hospital follow up  Patient presents with her family. History primarily per family. They state that no significant improvement of her left upper and lower extremity. The patient has been less interactive. She was recently diagnosed with a UTI, recently began antibiotic therapy. She is on a thick liquid diet. States difficulty taking in liquids, she is unable to use a straw. She continues on Keppra. She is seeing neurology as scheduled in August for stroke management. She has continued confusion since her discharge, this has not progressed. History of dementia. Per family, she clinically appears mildly worse. In the hospital she was more awake and interactive on her right side. They state that she is usually more awake in the morning and more fatigued by the end of the day. She does move her right upper and lower extremity spontaneously, per family description. Initial Consult Note: 7/12/23  HISTORY OF PRESENT ILLNESS:     Jessica Lopez is a(n) 79year old female on Eliquis for PE per son at bedside who presented to the ED from her nursing home after the patient was noted to have a left facial droop with left hemiparesis. According to chart review, patient's last known well was 2100 yesterday. Her deficits were noted around 0650 this morning. EMS was called and the patient was transferred to [facility].  Further work-up with a brain CT demonstrated a right thalamic hemorrhage with intraventricular extension for which neurosurgery was consulted. Mery Cid was recommended. CTA negative for vascular malformation.   The patient is unaware of why she was recommended to go to the hospital.  The son reports that she was recently diagnosed with dementia. She is not aware of any headaches, changes in vision or speech, nausea/vomiting, dizziness/lightheadedness, or weakness in her upper and lower extremities. Past Medical History:   Diagnosis Date    Anxiety     Cancer ([facility]     Dementia ([facility] [facility]     Depression     Pulmonary embolism ([address]     Suicidal ideations     SVT (supraventricular tachycardia) (HCC)     Thyroid disease     Unsteadiness on feet     UTI (urinary tract infection)       Past Surgical History:   Procedure Laterality Date    BREAST BIOPSY Right     LUMPECTOMY RIGHT        No family history on file. Social History    Socioeconomic History      Marital status:     Tobacco Use      Smoking status: Never      Smokeless tobacco: Never    Vaping Use      Vaping Use: Never used    Substance and Sexual Activity      Alcohol use: Never      Drug use: Never       Metoclopramide          RESTLESSNESS   Current Medications:  Current Outpatient Medications   Medication Sig Dispense Refill    acetaminophen 325 MG Oral Tab Take 2 tablets (650 mg total) by mouth every 4 (four) hours as needed. 0    enoxaparin 40 MG/0.4ML Injection Solution Prefilled Syringe Inject 0.4 mL (40 mg total) into the skin daily. 0    amiodarone 200 MG Oral Tab Take 1 tablet (200 mg total) by mouth daily. 30 tablet 3    metoprolol tartrate 50 MG Oral Tab Take 1 tablet (50 mg total) by mouth 2x Daily(Beta Blocker). 60 tablet 3    levETIRAcetam 100 MG/ML Oral Solution Take 5 mL (500 mg total) by mouth 2 (two) times daily. 300 mL 2    losartan 25 MG Oral Tab Take 1 tablet (25 mg total) by mouth daily. 30 tablet 11    memantine 10 MG Oral Tab Take 1 tablet (10 mg total) by mouth 2 (two) times daily. Multiple Vitamins-Minerals (MULTI-VITAMIN/MINERALS) Oral Tab Take 1 tablet by mouth daily.       levothyroxine 88 MCG Oral Tab Take 1 tablet (88 mcg total) by mouth before breakfast.      BUSPIRONE HCL OR Take 20 mg by mouth in the morning and 20 mg before bedtime. anastrozole 1 MG Oral Tab tab Take 1 tablet (1 mg total) by mouth daily. REVIEW OF SYSTEMS   Comprehensive review of systems done. Negative except what is outlined in the above HPI. PHYSICAL EXAMIMATION    vitals were not taken for this visit. GENERAL: Very pleasant patient is in no apparent distress. Sitting comfortably in the wheelchair. HEENT: Normocephalic, atraumatic. RESPIRATORY RATE: Easy and Even   SKIN: Warm and dry  NEURO: The patient appears drowsy but awakens to verbal stimuli. She is oriented to person. She does not respond when questioned about a year or place. Continued facial droop. The patient does not participate when asked to assess for drift, finger-nose or rapid alternating movements. She will stick her tongue out, appears midline. Follows minimal commands. SPINE:  Gait/Coordination: Gait deferred. Continued dense left hemiparesis. Spontaneous movement of the right upper and lower extremity, although does not participate in motor strength exam.      DATA:   None    IMAGING:   Study Result    Narrative   PROCEDURE:  CT BRAIN OR HEAD (54375)     COMPARISON:  [facility] , CT, CT BRAIN OR HEAD ([id number]), 7/12/2023, 1:46 PM.     INDICATIONS:  I62.9 Intracranial hemorrhage (HCC)     TECHNIQUE:  Noncontrast CT scanning is performed through the brain. Dose reduction techniques were used. Dose information is transmitted to the [facility]) NRDR ([address] [place] [address] Rd) which includes the Dose Index  Registry. PATIENT STATED HISTORY: (As transcribed by Technologist)  Hx of Intracranial hemorrhage. FINDINGS:  Decreasing attenuation and size of a previously visualized parenchymal hematoma centered in the right thalamus. This now has a reference measurement of approximately 2.0 x 1.9 x 2.8 cm, previously 2.9 x 2.6 x 3.8 cm. There is decreased mass effect.     There is hypoattenuation surrounding the hematoma that likely represents edema with associated mild mass effect. There is overall significantly decreased intraventricular hemorrhage with minimal residual layering hemorrhage along the occipital horns of the lateral ventricles. Stable moderate chronic microvascular ischemic changes in the cerebral white matter. No evidence of acute territorial infarction. Stable small left bilateral cerebellar infarcts noted. Stable mild global brain parenchymal volume loss without overt hydrocephalus. There is no midline shift. The basal cisterns are patent. The gray-white matter differentiation is intact. There is no evident fracture. The visualized paranasal sinuses and mastoid air cells are unremarkable. Incomplete fusion of the posterior arch of C1 is considered a normal variant. Bilateral intra-ocular lens implants. Impression   CONCLUSION:       1. Decreasing attenuation and size of a previously visualized parenchymal hematoma centered in the right thalamus. This now has a reference measurement of approximately 2.0 x 1.9 x 2.8 cm, previously 2.9 x 2.6 x 3.8 cm. There is decreased mass effect. There is hypoattenuation surrounding the hematoma that likely represents edema with associated mild mass effect. 2. There is overall significantly decreased intraventricular hemorrhage with minimal residual layering hemorrhage along the occipital horns of the lateral ventricles. 3. Stable moderate chronic microvascular ischemic changes in the cerebral white matter. No evidence of acute territorial infarction. Stable small left bilateral cerebellar infarcts noted. Please see above for further details.      LOCATION:  [id number]        Dictated by (CST): Abrahan Gregorio MD on 7/28/2023 at 6:19 AM      Finalized by (CST): Abrahan Gregorio MD on 7/28/2023 at 6:22 AM       MEDICAL DECISION MAKING:     ASSESSMENT and PLAN:  (I62.9) Intracranial hemorrhage ([address]  (primary encounter diagnosis)    PLAN:   1. Medication: None prescribed   - Discussed with Dr. Tiff Samayoa, may continue Lovenox for DVT prophylaxis. Hold other anticoagulation/antiplatelet therapy until repeat head CT reviewed   2. Imaging:    - Reviewed today:    - CT head:     - Agree with radiology, decreased parenchymal hematoma. - Ordered today:    - CT head:     - 2 weeks  3. Neurology:   - Follow up as schedule for Keppra and stroke management   4. Follow up in 2 weeks for interval CT scan monitoring or call or follow up sooner or go to the ED for any new, worsening or concerning signs or symptoms    -This is a pleasant 70-year-old female who presents today for hospital follow-up for right IPH. CT with improvement. Family is concerned that the patient is clinically worsened since hospitalization. Please see HPI for further details. The patient was recently diagnosed with UTI, recently began antibiotic therapy. I recommended the patient go to the ED for further evaluation given her clinical decline since hospitalization. Family would like time to discuss this and to discuss further with the primary care physician managing her at [facility] ([facility] I strongly encouraged if any new, worsening or concerning signs/symptoms proceed directly to the ED. Family verbalized understanding. Total visit time: 35 min  More than 50% spent coordinating care, providing patient education, reviewing imaging, discussing further imaging, discussing neurology, discussing ED and counseling. Rao Harris M.S., JERAD VANG [address], [facility] Brandon Dave, [address] [facility]  [phone]  7/31/2023 1:55 PM    Dragon speech recognition software was used to prepare this note. If a word or phrase is confusing, it is likely due to a failure of recognition. Please contact me with any questions or clarifications.

## 2023-08-01 ENCOUNTER — APPOINTMENT (OUTPATIENT)
Dept: HEMATOLOGY/ONCOLOGY | Facility: HOSPITAL | Age: 83
End: 2023-08-01
Attending: INTERNAL MEDICINE
Payer: MEDICARE

## 2023-08-16 ENCOUNTER — TELEPHONE (OUTPATIENT)
Dept: MEDSURG UNIT | Facility: HOSPITAL | Age: 83
End: 2023-08-16

## 2023-08-16 NOTE — PROGRESS NOTES
[facility]    Patient Name: Bennie Joseph Date: 23   : 1940 Gender: female   Date of hospital admission: 2023 Date of hospital discharge: 2023   Stroke: Hemorrhagic Discharge disposition: SNF     Called patient's son, Samson Downey, to obtain 30 day follow-up. No answer. Left voicemail and instructed him to call the non-emergent stroke line [phone] with any questions or concerns.

## 2023-08-26 ENCOUNTER — HOSPITAL ENCOUNTER (INPATIENT)
Facility: HOSPITAL | Age: 83
LOS: 2 days | Discharge: HOSPICE | DRG: 378 | End: 2023-08-28
Attending: EMERGENCY MEDICINE | Admitting: INTERNAL MEDICINE
Payer: MEDICARE

## 2023-08-26 ENCOUNTER — HOSPITAL ENCOUNTER (INPATIENT)
Facility: HOSPITAL | Age: 83
LOS: 2 days | Discharge: HOSPICE | End: 2023-08-28
Attending: EMERGENCY MEDICINE | Admitting: INTERNAL MEDICINE
Payer: MEDICARE

## 2023-08-26 ENCOUNTER — HOSPITAL ENCOUNTER (OUTPATIENT)
Facility: HOSPITAL | Age: 83
Setting detail: OBSERVATION
Discharge: HOSPICE | End: 2023-08-28
Attending: EMERGENCY MEDICINE | Admitting: INTERNAL MEDICINE
Payer: MEDICARE

## 2023-08-26 DIAGNOSIS — I95.9 HYPOTENSION, UNSPECIFIED HYPOTENSION TYPE: ICD-10-CM

## 2023-08-26 DIAGNOSIS — K92.2 LOWER GI BLEEDING: Primary | ICD-10-CM

## 2023-08-26 LAB
ALBUMIN SERPL-MCNC: 2.7 G/DL (ref 3.4–5)
ALBUMIN/GLOB SERPL: 0.8 {RATIO} (ref 1–2)
ALP LIVER SERPL-CCNC: 50 U/L
ALT SERPL-CCNC: 20 U/L
ANION GAP SERPL CALC-SCNC: 8 MMOL/L (ref 0–18)
ANTIBODY SCREEN: NEGATIVE
APTT PPP: 25.8 SECONDS (ref 23.3–35.6)
AST SERPL-CCNC: 29 U/L (ref 15–37)
BASOPHILS # BLD AUTO: 0.06 X10(3) UL (ref 0–0.2)
BASOPHILS NFR BLD AUTO: 0.6 %
BILIRUB SERPL-MCNC: 0.6 MG/DL (ref 0.1–2)
BUN BLD-MCNC: 26 MG/DL (ref 7–18)
CALCIUM BLD-MCNC: 8.2 MG/DL (ref 8.5–10.1)
CHLORIDE SERPL-SCNC: 112 MMOL/L (ref 98–112)
CO2 SERPL-SCNC: 22 MMOL/L (ref 21–32)
CREAT BLD-MCNC: 1.07 MG/DL
EGFRCR SERPLBLD CKD-EPI 2021: 51 ML/MIN/1.73M2 (ref 60–?)
EOSINOPHIL # BLD AUTO: 0.07 X10(3) UL (ref 0–0.7)
EOSINOPHIL NFR BLD AUTO: 0.6 %
ERYTHROCYTE [DISTWIDTH] IN BLOOD BY AUTOMATED COUNT: 16.9 %
GLOBULIN PLAS-MCNC: 3.5 G/DL (ref 2.8–4.4)
GLUCOSE BLD-MCNC: 155 MG/DL (ref 70–99)
HCT VFR BLD AUTO: 34.9 %
HGB BLD-MCNC: 10.9 G/DL
IMM GRANULOCYTES # BLD AUTO: 0.16 X10(3) UL (ref 0–1)
IMM GRANULOCYTES NFR BLD: 1.5 %
INR BLD: 1.31 (ref 0.85–1.16)
LYMPHOCYTES # BLD AUTO: 2.03 X10(3) UL (ref 1–4)
LYMPHOCYTES NFR BLD AUTO: 18.7 %
MCH RBC QN AUTO: 27.9 PG (ref 26–34)
MCHC RBC AUTO-ENTMCNC: 31.2 G/DL (ref 31–37)
MCV RBC AUTO: 89.3 FL
MONOCYTES # BLD AUTO: 0.55 X10(3) UL (ref 0.1–1)
MONOCYTES NFR BLD AUTO: 5.1 %
NEUTROPHILS # BLD AUTO: 7.98 X10 (3) UL (ref 1.5–7.7)
NEUTROPHILS # BLD AUTO: 7.98 X10(3) UL (ref 1.5–7.7)
NEUTROPHILS NFR BLD AUTO: 73.5 %
OSMOLALITY SERPL CALC.SUM OF ELEC: 302 MOSM/KG (ref 275–295)
PLATELET # BLD AUTO: 253 10(3)UL (ref 150–450)
POTASSIUM SERPL-SCNC: 4.6 MMOL/L (ref 3.5–5.1)
PROT SERPL-MCNC: 6.2 G/DL (ref 6.4–8.2)
PROTHROMBIN TIME: 16.3 SECONDS (ref 11.6–14.8)
RBC # BLD AUTO: 3.91 X10(6)UL
RH BLOOD TYPE: NEGATIVE
SARS-COV-2 RNA RESP QL NAA+PROBE: NOT DETECTED
SODIUM SERPL-SCNC: 142 MMOL/L (ref 136–145)
WBC # BLD AUTO: 10.9 X10(3) UL (ref 4–11)

## 2023-08-26 PROCEDURE — 99291 CRITICAL CARE FIRST HOUR: CPT | Performed by: NURSE PRACTITIONER

## 2023-08-26 PROCEDURE — 30233N1 TRANSFUSION OF NONAUTOLOGOUS RED BLOOD CELLS INTO PERIPHERAL VEIN, PERCUTANEOUS APPROACH: ICD-10-PCS | Performed by: INTERNAL MEDICINE

## 2023-08-26 RX ORDER — SODIUM CHLORIDE 9 MG/ML
125 INJECTION, SOLUTION INTRAVENOUS CONTINUOUS
Status: DISCONTINUED | OUTPATIENT
Start: 2023-08-26 | End: 2023-08-26

## 2023-08-26 RX ORDER — LORAZEPAM 2 MG/ML
0.5 CONCENTRATE ORAL EVERY 4 HOURS PRN
COMMUNITY

## 2023-08-26 RX ORDER — SODIUM CHLORIDE 9 MG/ML
INJECTION, SOLUTION INTRAVENOUS CONTINUOUS
Status: DISCONTINUED | OUTPATIENT
Start: 2023-08-26 | End: 2023-08-28

## 2023-08-26 RX ORDER — SODIUM CHLORIDE 9 MG/ML
1000 INJECTION, SOLUTION INTRAVENOUS ONCE
Status: COMPLETED | OUTPATIENT
Start: 2023-08-26 | End: 2023-08-26

## 2023-08-26 RX ORDER — MORPHINE SULFATE 20 MG/1
20 CAPSULE, EXTENDED RELEASE ORAL EVERY 2 HOUR PRN
Status: ON HOLD | COMMUNITY
End: 2023-08-26

## 2023-08-26 RX ORDER — MORPHINE SULFATE 20 MG/ML
5 SOLUTION ORAL
COMMUNITY

## 2023-08-26 RX ORDER — SCOLOPAMINE TRANSDERMAL SYSTEM 1 MG/1
1 PATCH, EXTENDED RELEASE TRANSDERMAL
COMMUNITY

## 2023-08-26 RX ORDER — METOPROLOL TARTRATE 50 MG/1
50 TABLET, FILM COATED ORAL
Status: DISCONTINUED | OUTPATIENT
Start: 2023-08-26 | End: 2023-08-28

## 2023-08-26 RX ORDER — LEVOTHYROXINE SODIUM 88 UG/1
88 TABLET ORAL
Status: DISCONTINUED | OUTPATIENT
Start: 2023-08-26 | End: 2023-08-26

## 2023-08-26 RX ORDER — ONDANSETRON 4 MG/1
4 TABLET, ORALLY DISINTEGRATING ORAL EVERY 6 HOURS
COMMUNITY

## 2023-08-26 RX ORDER — PHENOBARBITAL, HYOSCYAMINE SULFATE, ATROPINE SULFATE AND SCOPOLAMINE HYDROBROMIDE .0194; .1037; 16.2; .0065 MG/1; MG/1; MG/1; MG/1
1 TABLET ORAL EVERY 6 HOURS PRN
Status: ON HOLD | COMMUNITY
End: 2023-08-26

## 2023-08-26 RX ORDER — ONDANSETRON 2 MG/ML
4 INJECTION INTRAMUSCULAR; INTRAVENOUS EVERY 6 HOURS PRN
Status: DISCONTINUED | OUTPATIENT
Start: 2023-08-26 | End: 2023-08-28

## 2023-08-26 RX ORDER — ACETAMINOPHEN 650 MG/1
650 SUPPOSITORY RECTAL EVERY 4 HOURS PRN
COMMUNITY

## 2023-08-26 RX ORDER — MORPHINE SULFATE 20 MG/ML
0.25 SOLUTION ORAL EVERY 2 HOUR PRN
COMMUNITY

## 2023-08-26 RX ORDER — AMIODARONE HYDROCHLORIDE 200 MG/1
200 TABLET ORAL DAILY
Status: DISCONTINUED | OUTPATIENT
Start: 2023-08-27 | End: 2023-08-28

## 2023-08-26 RX ORDER — BISACODYL 10 MG
10 SUPPOSITORY, RECTAL RECTAL DAILY PRN
COMMUNITY

## 2023-08-26 RX ORDER — SODIUM CHLORIDE 9 MG/ML
INJECTION, SOLUTION INTRAVENOUS CONTINUOUS
Status: DISCONTINUED | OUTPATIENT
Start: 2023-08-26 | End: 2023-08-26

## 2023-08-26 RX ORDER — ASPIRIN 81 MG/1
81 TABLET ORAL DAILY
Status: ON HOLD | COMMUNITY
End: 2023-08-28

## 2023-08-26 RX ORDER — ACETAMINOPHEN 500 MG
500 TABLET ORAL EVERY 4 HOURS PRN
Status: DISCONTINUED | OUTPATIENT
Start: 2023-08-26 | End: 2023-08-26

## 2023-08-26 RX ORDER — ALBUMIN (HUMAN) 12.5 G/50ML
25 SOLUTION INTRAVENOUS ONCE
Status: COMPLETED | OUTPATIENT
Start: 2023-08-26 | End: 2023-08-26

## 2023-08-26 RX ORDER — LEVETIRACETAM 500 MG/5ML
500 INJECTION, SOLUTION, CONCENTRATE INTRAVENOUS EVERY 12 HOURS
Status: DISCONTINUED | OUTPATIENT
Start: 2023-08-26 | End: 2023-08-28

## 2023-08-26 RX ORDER — ANASTROZOLE 1 MG/1
1 TABLET ORAL DAILY
Status: DISCONTINUED | OUTPATIENT
Start: 2023-08-27 | End: 2023-08-26

## 2023-08-26 RX ORDER — LORAZEPAM 2 MG/ML
0.5 CONCENTRATE ORAL 2 TIMES DAILY
COMMUNITY

## 2023-08-26 RX ORDER — LEVETIRACETAM 100 MG/ML
500 SOLUTION ORAL 2 TIMES DAILY
Status: DISCONTINUED | OUTPATIENT
Start: 2023-08-26 | End: 2023-08-26

## 2023-08-26 NOTE — ED INITIAL ASSESSMENT (HPI)
C/o GI rectal bleed; EMS stated there was a significant amount of blood on the pts chux when transferred to EMS [place]; they estimated \"Pint\" in loss; pt currently on hospice at [facility]; DNR w/pt paperwork from EMS; RN calling for Hospice updated now

## 2023-08-26 NOTE — ED QUICK NOTES
Spoke w/POA Donavan Kaur; let family know that stafff needs guidance on efforts to investigate, diagnose, treat or not to treat; communication handed over to MD who spoke w/family.

## 2023-08-26 NOTE — ED QUICK NOTES
This writer spoke with [facility], they are aware that the patient was brought to ER. They said to reach out to POA to see what all they want done for this patient. Active DNR in paperwork from EMS.

## 2023-08-27 PROBLEM — E86.1 HYPOTENSION DUE TO HYPOVOLEMIA: Status: ACTIVE | Noted: 2023-08-26

## 2023-08-27 PROBLEM — G40.909 SEIZURE DISORDER (HCC): Status: ACTIVE | Noted: 2023-08-27

## 2023-08-27 PROBLEM — I95.89 HYPOTENSION DUE TO HYPOVOLEMIA: Status: ACTIVE | Noted: 2023-08-26

## 2023-08-27 PROBLEM — I47.10 SVT (SUPRAVENTRICULAR TACHYCARDIA): Status: ACTIVE | Noted: 2023-08-27

## 2023-08-27 PROBLEM — I47.1 SVT (SUPRAVENTRICULAR TACHYCARDIA) (HCC): Status: ACTIVE | Noted: 2023-08-27

## 2023-08-27 LAB
ANION GAP SERPL CALC-SCNC: 4 MMOL/L (ref 0–18)
BASOPHILS # BLD AUTO: 0.03 X10(3) UL (ref 0–0.2)
BASOPHILS NFR BLD AUTO: 0.3 %
BILIRUB UR QL STRIP.AUTO: NEGATIVE
BUN BLD-MCNC: 22 MG/DL (ref 7–18)
CALCIUM BLD-MCNC: 7.1 MG/DL (ref 8.5–10.1)
CHLORIDE SERPL-SCNC: 120 MMOL/L (ref 98–112)
CO2 SERPL-SCNC: 20 MMOL/L (ref 21–32)
CREAT BLD-MCNC: 0.75 MG/DL
EGFRCR SERPLBLD CKD-EPI 2021: 78 ML/MIN/1.73M2 (ref 60–?)
EOSINOPHIL # BLD AUTO: 0.19 X10(3) UL (ref 0–0.7)
EOSINOPHIL NFR BLD AUTO: 1.6 %
ERYTHROCYTE [DISTWIDTH] IN BLOOD BY AUTOMATED COUNT: 16.2 %
GLUCOSE BLD-MCNC: 84 MG/DL (ref 70–99)
GLUCOSE UR STRIP.AUTO-MCNC: NORMAL MG/DL
HCT VFR BLD AUTO: 28.3 %
HGB BLD-MCNC: 8.5 G/DL
HGB BLD-MCNC: 8.6 G/DL
HGB BLD-MCNC: 8.6 G/DL
HGB BLD-MCNC: 8.7 G/DL
HGB BLD-MCNC: 9.4 G/DL
IMM GRANULOCYTES # BLD AUTO: 0.1 X10(3) UL (ref 0–1)
IMM GRANULOCYTES NFR BLD: 0.9 %
KETONES UR STRIP.AUTO-MCNC: 20 MG/DL
LEUKOCYTE ESTERASE UR QL STRIP.AUTO: 500
LYMPHOCYTES # BLD AUTO: 2.4 X10(3) UL (ref 1–4)
LYMPHOCYTES NFR BLD AUTO: 20.4 %
MCH RBC QN AUTO: 28.9 PG (ref 26–34)
MCHC RBC AUTO-ENTMCNC: 30.4 G/DL (ref 31–37)
MCV RBC AUTO: 95 FL
MONOCYTES # BLD AUTO: 0.8 X10(3) UL (ref 0.1–1)
MONOCYTES NFR BLD AUTO: 6.8 %
MRSA DNA SPEC QL NAA+PROBE: POSITIVE
NEUTROPHILS # BLD AUTO: 8.24 X10 (3) UL (ref 1.5–7.7)
NEUTROPHILS # BLD AUTO: 8.24 X10(3) UL (ref 1.5–7.7)
NEUTROPHILS NFR BLD AUTO: 70 %
OSMOLALITY SERPL CALC.SUM OF ELEC: 301 MOSM/KG (ref 275–295)
PH UR STRIP.AUTO: 6.5 [PH] (ref 5–8)
PLATELET # BLD AUTO: 204 10(3)UL (ref 150–450)
POTASSIUM SERPL-SCNC: 3.8 MMOL/L (ref 3.5–5.1)
PROT UR STRIP.AUTO-MCNC: 20 MG/DL
RBC # BLD AUTO: 2.98 X10(6)UL
RBC #/AREA URNS AUTO: >10 /HPF
SODIUM SERPL-SCNC: 144 MMOL/L (ref 136–145)
SP GR UR STRIP.AUTO: 1.01 (ref 1–1.03)
UROBILINOGEN UR STRIP.AUTO-MCNC: NORMAL MG/DL
WBC # BLD AUTO: 11.8 X10(3) UL (ref 4–11)

## 2023-08-27 PROCEDURE — 99223 1ST HOSP IP/OBS HIGH 75: CPT | Performed by: INTERNAL MEDICINE

## 2023-08-27 RX ORDER — RUFINAMIDE 40 MG/ML
1 SUSPENSION ORAL DAILY
Status: DISCONTINUED | OUTPATIENT
Start: 2023-08-27 | End: 2023-08-28

## 2023-08-27 RX ORDER — METOPROLOL TARTRATE 1 MG/ML
5 INJECTION, SOLUTION INTRAVENOUS EVERY 6 HOURS PRN
Status: DISCONTINUED | OUTPATIENT
Start: 2023-08-27 | End: 2023-08-28

## 2023-08-27 NOTE — PROGRESS NOTES
ICU transfer, received patient alert x 2, on room air, patient family at the bedside, SCD's on , acclimated to room, set up on IV fluids, bed alarm engaged.

## 2023-08-27 NOTE — PLAN OF CARE
Admitted for GIB. Pt arrived lethargic, not following commands, not answering questions. RUE and RLE moves spontaneously and purposefully. LUE and LLE spastic and stiff. Borderline BP. 1u PRBCs transfused. Post transfusion Hgb 8.7  Family informed on plan of care. Will continue to closely monitor. Problem: Delirium  Goal: Minimize duration of delirium  Description: Interventions:  - Encourage use of hearing aids, eye glasses  - Promote highest level of mobility daily  - Provide frequent reorientation  - Promote wakefulness i.e. lights on, blinds open  - Promote sleep, encourage patient's normal rest cycle i.e. lights off, TV off, minimize noise and interruptions  - Encourage family to assist in orientation and promotion of home routines  Outcome: Progressing     Problem: CARDIOVASCULAR - ADULT  Goal: Maintains optimal cardiac output and hemodynamic stability  Description: INTERVENTIONS:  - Monitor vital signs, rhythm, and trends  - Monitor for bleeding, hypotension and signs of decreased cardiac output  - Evaluate effectiveness of vasoactive medications to optimize hemodynamic stability  - Monitor arterial and/or venous puncture sites for bleeding and/or hematoma  - Assess quality of pulses, skin color and temperature  - Assess for signs of decreased coronary artery perfusion - ex.  Angina  - Evaluate fluid balance, assess for edema, trend weights  Outcome: Progressing  Goal: Absence of cardiac arrhythmias or at baseline  Description: INTERVENTIONS:  - Continuous cardiac monitoring, monitor vital signs, obtain 12 lead EKG if indicated  - Evaluate effectiveness of antiarrhythmic and heart rate control medications as ordered  - Initiate emergency measures for life threatening arrhythmias  - Monitor electrolytes and administer replacement therapy as ordered  Outcome: Progressing     Problem: SAFETY ADULT - FALL  Goal: Free from fall injury  Description: INTERVENTIONS:  - Assess pt frequently for physical needs  - Identify cognitive and physical deficits and behaviors that affect risk of falls.   - [place] fall precautions as indicated by assessment.  - Educate pt/family on patient safety including physical limitations  - Instruct pt to call for assistance with activity based on assessment  - Modify environment to reduce risk of injury  - Provide assistive devices as appropriate  - Consider OT/PT consult to assist with strengthening/mobility  - Encourage toileting schedule  Outcome: Progressing     Problem: PAIN - ADULT  Goal: Verbalizes/displays adequate comfort level or patient's stated pain goal  Description: INTERVENTIONS:  - Encourage pt to monitor pain and request assistance  - Assess pain using appropriate pain scale  - Administer analgesics based on type and severity of pain and evaluate response  - Implement non-pharmacological measures as appropriate and evaluate response  - Consider cultural and social influences on pain and pain management  - Manage/alleviate anxiety  - Utilize distraction and/or relaxation techniques  - Monitor for opioid side effects  - Notify MD/LIP if interventions unsuccessful or patient reports new pain  - Anticipate increased pain with activity and pre-medicate as appropriate  Outcome: Progressing     Problem: SKIN/TISSUE INTEGRITY - ADULT  Goal: Skin integrity remains intact  Description: INTERVENTIONS  - Assess and document risk factors for pressure ulcer development  - Assess and document skin integrity  - Monitor for areas of redness and/or skin breakdown  - Initiate interventions, skin care algorithm/standards of care as needed  Outcome: Progressing  Goal: Incision(s), wounds(s) or drain site(s) healing without S/S of infection  Description: INTERVENTIONS:  - Assess and document risk factors for pressure ulcer development  - Assess and document skin integrity  - Assess and document dressing/incision, wound bed, drain sites and surrounding tissue  - Implement wound care per orders  - Initiate isolation precautions as appropriate  - Initiate Pressure Ulcer prevention bundle as indicated  Outcome: Progressing  Goal: Oral mucous membranes remain intact  Description: INTERVENTIONS  - Assess oral mucosa and hygiene practices  - Implement preventative oral hygiene regimen  - Implement oral medicated treatments as ordered  Outcome: Progressing

## 2023-08-27 NOTE — ED QUICK NOTES
Bedside patient report given to SHOBHA Byrd. Waiting on pt family arrival; on second bag of fluids; DNR in chart; hospice and assistive living contacted.  Pt in reverse [place]

## 2023-08-27 NOTE — PLAN OF CARE
Assumed care of pt after shift report. Drowsy, arousable to voice. Oriented to self. Afebrile. Normotensive. -140s this afternoon - EKG completed, PRN lopressor ordered and given. RA. Speech eval, see note. Clear liquid diet. Q12 hgb. No bloody stools. Pt complaining of urinary retention, straight cath x1. Cloudy/foul urine, urine culture sent. Denies pain. Transfer orders received, report given to [employer]. Family at bedside, updated and agreeable to plan of care. See MAR and flowsheets for additional information.

## 2023-08-27 NOTE — SLP NOTE
ADULT SWALLOWING EVALUATION    ASSESSMENT    ASSESSMENT/OVERALL IMPRESSION:  The patient is an 79year old female with past medical history of dementia, breast cancer, and stroke who is a SNF resident that is currently in Hospice care who presented to the ED on 08/26/23 via EMS with bright red blood per rectum. Per SHOBHA Rodriguez, the patient was on a puree diet with moderately thick liquids at SNF. The patient was referred to speech for bedside swallow evaluation. The patient is confused, and has difficulty following commands for formal oral mechanism examination. Overall lingual and labial ROM and strength are grossly symmetrical and intact. Dentition is natural and functional, with one missing posterior tooth in mandible. The patient demonstrates difficulty forming labial seal to cup presentation, but was able to suck via straw. Noted munching chew pattern with puree solids. Slowed A-P transfer. Suspect mistimed swallow response and decreased laryngeal elevation to palpation. No overt signs of aspiration noted. Recommend proceeding with a clear liquid diet, per GI, with thin liquids. The patient benefits from 1:1 assist with feeding, and does best with straw or teaspoon presentations. Recommend medications be given crushed in puree if ok with GI. SLP will continue to follow, and will assess for solids when approved by GI. Discussed recommendations and plan of care with Dr. Henry Franz and with SHOBHA Rodriguez. Willis Assessment of Swallow Function Score: Mild (176/200)    RECOMMENDATIONS   Diet Recommendations - Solids: Mechanical soft ground/ Minced & Moist  Diet Recommendations - Liquids: Thin Liquids                        Compensatory Strategies Recommended: Slow rate; Alternate consistencies;Small bites and sips; Extra sauce/gravy  Aspiration Precautions: Upright position  Medication Administration Recommendations: Crushed in puree  Treatment Plan/Recommendations: Dysphagia therapy; Aspiration precautions  Discharge Recommendations/Plan: Sub-acute rehabilitation    HISTORY   MEDICAL HISTORY  Reason for Referral: Altered diet consistency    Problem List  Principal Problem:    Lower GI bleeding  Active Problems:    Hypotension due to hypovolemia    SVT (supraventricular tachycardia) (HCC)    Seizure disorder (HCC)      Past Medical History  Past Medical History:   Diagnosis Date    Anxiety     Breast cancer ([facility] [address] [facility]     Dementia ([email or web address]     Depression     HTN (hypertension)     Hypothyroidism     Pulmonary embolism (HCC)     SVT (supraventricular tachycardia) ([email or web address]        Prior Living Situation: Skilled nursing facility  Diet Prior to Admission: Puree; Honey thick liquids/ Moderately thick  Precautions: Aspiration    Patient/Family Goals: The patient did not state goals this session. SWALLOWING HISTORY  Current Diet Consistency: NPO  Dysphagia History: Previously seen at bedside by this service on 07/15/23 with the diet recommendation for puree and mildly thick liquids. Imaging Results: No imaging on file for this hospitalization    SUBJECTIVE   The patient was seen in room, at bedside. No family was present for session. The patient is anxious and confused stating, \"I just want someone to stay and hold my hand\". OBJECTIVE   ORAL MOTOR EXAMINATION  Dentition: Natural;Functional  Symmetry: Within Functional Limits  Strength: Unable to assess  Tone: Within Functional Limits  Range of Motion: Unable to assess  Rate of Motion: Reduced    Voice Quality: Clear  Respiratory Status: Unlabored  Consistencies Trialed: Thin liquids;Puree; Soft solid  Method of Presentation: Staff/Clinician assistance;Spoon;Straw;Single sips  Patient Positioning: Upright;Midline    Oral Phase of Swallow: Impaired  Bolus Retrieval: Impaired  Bilabial Seal: Impaired  Bolus Formation: Impaired  Bolus Propulsion: Impaired  Mastication: Impaired  Retention: Intact    Pharyngeal Phase of Swallow: Impaired  Laryngeal Elevation Timing: Appears impaired  Laryngeal Elevation Strength: Appears impaired  Laryngeal Elevation Coordination: Appears impaired  (Please note: Silent aspiration cannot be evaluated clinically. Videofluoroscopic Swallow Study is required to rule-out silent aspiration.)    Esophageal Phase of Swallow: No complaints consistent with possible esophageal involvement              GOALS  Goal #1 The patient will tolerate a clear liquid consistency and thin liquids without overt signs or symptoms of aspiration with 100 % accuracy over 2 session(s). In Progress   Goal #2 The patient will utilize compensatory strategies as outlined by  BSSE (clinical evaluation) including Slow rate, Upright 90 degrees with min assistance 90 % of the time across 2 sessions. In Progress                                   FOLLOW UP  Treatment Plan/Recommendations: Dysphagia therapy; Aspiration precautions  Number of Visits to Meet Established Goals: 4  Follow Up Needed (Documentation Required): Yes  SLP Follow-up Date: 08/28/23    Thank you for your referral.   If you have any questions, please contact LEYDA Luong, [address] [place] [address]  Office phone: [phone]  Pager: [phone], #[phone]

## 2023-08-28 VITALS
DIASTOLIC BLOOD PRESSURE: 56 MMHG | SYSTOLIC BLOOD PRESSURE: 118 MMHG | BODY MASS INDEX: 18.3 KG/M2 | RESPIRATION RATE: 18 BRPM | HEIGHT: 65 IN | TEMPERATURE: 99 F | WEIGHT: 109.81 LBS | OXYGEN SATURATION: 100 % | HEART RATE: 64 BPM

## 2023-08-28 LAB
ANION GAP SERPL CALC-SCNC: 4 MMOL/L (ref 0–18)
ATRIAL RATE: 126 BPM
ATRIAL RATE: 150 BPM
ATRIAL RATE: 76 BPM
BASOPHILS # BLD AUTO: 0.02 X10(3) UL (ref 0–0.2)
BASOPHILS NFR BLD AUTO: 0.2 %
BLOOD TYPE BARCODE: 9500
BUN BLD-MCNC: 9 MG/DL (ref 7–18)
CALCIUM BLD-MCNC: 7.2 MG/DL (ref 8.5–10.1)
CHLORIDE SERPL-SCNC: 117 MMOL/L (ref 98–112)
CO2 SERPL-SCNC: 23 MMOL/L (ref 21–32)
CREAT BLD-MCNC: 0.46 MG/DL
EGFRCR SERPLBLD CKD-EPI 2021: 94 ML/MIN/1.73M2 (ref 60–?)
EOSINOPHIL # BLD AUTO: 0.27 X10(3) UL (ref 0–0.7)
EOSINOPHIL NFR BLD AUTO: 3.1 %
ERYTHROCYTE [DISTWIDTH] IN BLOOD BY AUTOMATED COUNT: 16.7 %
GLUCOSE BLD-MCNC: 82 MG/DL (ref 70–99)
HCT VFR BLD AUTO: 26.9 %
HGB BLD-MCNC: 8.7 G/DL
HGB BLD-MCNC: 8.7 G/DL
IMM GRANULOCYTES # BLD AUTO: 0.06 X10(3) UL (ref 0–1)
IMM GRANULOCYTES NFR BLD: 0.7 %
LYMPHOCYTES # BLD AUTO: 2 X10(3) UL (ref 1–4)
LYMPHOCYTES NFR BLD AUTO: 23 %
MAGNESIUM SERPL-MCNC: 1.9 MG/DL (ref 1.6–2.6)
MCH RBC QN AUTO: 28.9 PG (ref 26–34)
MCHC RBC AUTO-ENTMCNC: 32.3 G/DL (ref 31–37)
MCV RBC AUTO: 89.4 FL
MONOCYTES # BLD AUTO: 0.61 X10(3) UL (ref 0.1–1)
MONOCYTES NFR BLD AUTO: 7 %
NEUTROPHILS # BLD AUTO: 5.73 X10 (3) UL (ref 1.5–7.7)
NEUTROPHILS # BLD AUTO: 5.73 X10(3) UL (ref 1.5–7.7)
NEUTROPHILS NFR BLD AUTO: 66 %
OSMOLALITY SERPL CALC.SUM OF ELEC: 296 MOSM/KG (ref 275–295)
P AXIS: 21 DEGREES
P AXIS: 77 DEGREES
P-R INTERVAL: 172 MS
P-R INTERVAL: 212 MS
PLATELET # BLD AUTO: 226 10(3)UL (ref 150–450)
POTASSIUM SERPL-SCNC: 3.3 MMOL/L (ref 3.5–5.1)
Q-T INTERVAL: 322 MS
Q-T INTERVAL: 366 MS
Q-T INTERVAL: 432 MS
QRS DURATION: 100 MS
QRS DURATION: 106 MS
QRS DURATION: 96 MS
QTC CALCULATION (BEZET): 486 MS
QTC CALCULATION (BEZET): 486 MS
QTC CALCULATION (BEZET): 530 MS
R AXIS: -14 DEGREES
R AXIS: -18 DEGREES
R AXIS: -9 DEGREES
RBC # BLD AUTO: 3.01 X10(6)UL
SODIUM SERPL-SCNC: 144 MMOL/L (ref 136–145)
T AXIS: 150 DEGREES
T AXIS: 74 DEGREES
T AXIS: 78 DEGREES
UNIT VOLUME: 350 ML
VENTRICULAR RATE: 126 BPM
VENTRICULAR RATE: 137 BPM
VENTRICULAR RATE: 76 BPM
WBC # BLD AUTO: 8.7 X10(3) UL (ref 4–11)

## 2023-08-28 RX ORDER — CIPROFLOXACIN 500 MG/1
500 TABLET, FILM COATED ORAL 2 TIMES DAILY
Qty: 10 TABLET | Refills: 0 | Status: SHIPPED | OUTPATIENT
Start: 2023-08-28 | End: 2023-09-02

## 2023-08-28 RX ORDER — LANSOPRAZOLE 30 MG/1
30 TABLET, ORALLY DISINTEGRATING, DELAYED RELEASE ORAL
Qty: 30 TABLET | Refills: 0 | Status: SHIPPED | OUTPATIENT
Start: 2023-08-28 | End: 2023-08-28

## 2023-08-28 RX ORDER — CIPROFLOXACIN 500 MG/1
500 TABLET, FILM COATED ORAL 2 TIMES DAILY
Qty: 10 TABLET | Refills: 0 | Status: SHIPPED | OUTPATIENT
Start: 2023-08-28 | End: 2023-08-28

## 2023-08-28 RX ORDER — CALCIUM GLUCONATE 20 MG/ML
2 INJECTION, SOLUTION INTRAVENOUS ONCE
Status: COMPLETED | OUTPATIENT
Start: 2023-08-28 | End: 2023-08-28

## 2023-08-28 RX ORDER — ASPIRIN 81 MG/1
81 TABLET ORAL DAILY
Refills: 0 | Status: SHIPPED | COMMUNITY
Start: 2023-09-04

## 2023-08-28 RX ORDER — LANSOPRAZOLE 30 MG/1
30 TABLET, ORALLY DISINTEGRATING, DELAYED RELEASE ORAL
Qty: 30 TABLET | Refills: 0 | Status: SHIPPED | OUTPATIENT
Start: 2023-08-28

## 2023-08-28 RX ORDER — LEVETIRACETAM 500 MG/1
500 TABLET ORAL 2 TIMES DAILY
Status: DISCONTINUED | OUTPATIENT
Start: 2023-08-28 | End: 2023-08-28

## 2023-08-28 RX ORDER — POTASSIUM CHLORIDE 1.5 G/1.58G
40 POWDER, FOR SOLUTION ORAL ONCE
Status: COMPLETED | OUTPATIENT
Start: 2023-08-28 | End: 2023-08-28

## 2023-08-28 RX ORDER — LANSOPRAZOLE 30 MG/1
30 TABLET, ORALLY DISINTEGRATING, DELAYED RELEASE ORAL
Status: DISCONTINUED | OUTPATIENT
Start: 2023-08-28 | End: 2023-08-28

## 2023-08-28 NOTE — CM/SW NOTE
Pt admitted for lower GI bleed. Pt came from [address] [place] [address]. Pt was on hospice (not sure which one) when she was at [address] [place] [address]. Coordinated dc time with Ganesh Fritz at [facility] [employer] [employer]. RN to call report to [phone]. [facility]  pt at 5:00pm today. PCS form completed. POLST given to pt.

## 2023-08-28 NOTE — PROGRESS NOTES
NURSING DISCHARGE NOTE    Discharged Nursing home via Ambulance. Accompanied by Family member and Support staff  Belongings Taken by patient/family. Discharge instructions explained to patient + LOGAN Maria at bedside. IV removed. Patient's belongings taken by patient + ambulance service. No further questions at this time.  This RN called report over to [place]

## 2023-08-28 NOTE — PLAN OF CARE
Pt A&O to self. Fearful, delirium and restless present. L sided weakness/contraction due to hx of stroke. Seizure precautions, IV Keppra. Room air, . Tele, NSR w/ heart block. DW. IVF. IV abx. Purewick and brief in place, incontinent x2. No evidence of rectal bleeding overnight. Mepilex on sacrum. No c/o of pain, sob, n/v/d. Pt is bedrest due to increased weakness. Clear liquid diet, order set. Takes meds crushed in applesauce. Pt updated on poc. No further needs at this time. Safety/fall/aspiration precautions in place. Call light within reach.        Problem: Patient/Family Goals  Goal: Patient/Family Long Term Goal  Description: Patient's Long Term Goal: discharge with adequate resources    Interventions:  - follow poc: labs, continuous monitoring, IVF  - See additional Care Plan goals for specific interventions  Outcome: Progressing  Goal: Patient/Family Short Term Goal  Description: Patient's Short Term Goal: 8/27 NOC: no bleeding overnight, sleep well     Interventions:   - continuous monitoring, q12h hgb  -cluster care, dim lighting  - See additional Care Plan goals for specific interventions  Outcome: Progressing     Problem: Delirium  Goal: Minimize duration of delirium  Description: Interventions:  - Encourage use of hearing aids, eye glasses  - Promote highest level of mobility daily  - Provide frequent reorientation  - Promote wakefulness i.e. lights on, blinds open  - Promote sleep, encourage patient's normal rest cycle i.e. lights off, TV off, minimize noise and interruptions  - Encourage family to assist in orientation and promotion of home routines  Outcome: Progressing     Problem: PAIN - ADULT  Goal: Verbalizes/displays adequate comfort level or patient's stated pain goal  Description: INTERVENTIONS:  - Encourage pt to monitor pain and request assistance  - Assess pain using appropriate pain scale  - Administer analgesics based on type and severity of pain and evaluate response  - Implement non-pharmacological measures as appropriate and evaluate response  - Consider cultural and social influences on pain and pain management  - Manage/alleviate anxiety  - Utilize distraction and/or relaxation techniques  - Monitor for opioid side effects  - Notify MD/LIP if interventions unsuccessful or patient reports new pain  - Anticipate increased pain with activity and pre-medicate as appropriate  Outcome: Progressing     Problem: RISK FOR INFECTION - ADULT  Goal: Absence of fever/infection during anticipated neutropenic period  Description: INTERVENTIONS  - Monitor WBC  - Administer growth factors as ordered  - Implement neutropenic guidelines  Outcome: Progressing     Problem: SAFETY ADULT - FALL  Goal: Free from fall injury  Description: INTERVENTIONS:  - Assess pt frequently for physical needs  - Identify cognitive and physical deficits and behaviors that affect risk of falls.   - [place] fall precautions as indicated by assessment.  - Educate pt/family on patient safety including physical limitations  - Instruct pt to call for assistance with activity based on assessment  - Modify environment to reduce risk of injury  - Provide assistive devices as appropriate  - Consider OT/PT consult to assist with strengthening/mobility  - Encourage toileting schedule  Outcome: Progressing     Problem: DISCHARGE PLANNING  Goal: Discharge to home or other facility with appropriate resources  Description: INTERVENTIONS:  - Identify barriers to discharge w/pt and caregiver  - Include patient/family/discharge partner in discharge planning  - Arrange for needed discharge resources and transportation as appropriate  - Identify discharge learning needs (meds, wound care, etc)  - Arrange for interpreters to assist at discharge as needed  - Consider post-discharge preferences of patient/family/discharge partner  - Complete POLST form as appropriate  - Assess patient's ability to be responsible for managing their own health  - Refer to Case Management Department for coordinating discharge planning if the patient needs post-hospital services based on physician/LIP order or complex needs related to functional status, cognitive ability or social support system  Outcome: Progressing     Problem: CARDIOVASCULAR - ADULT  Goal: Maintains optimal cardiac output and hemodynamic stability  Description: INTERVENTIONS:  - Monitor vital signs, rhythm, and trends  - Monitor for bleeding, hypotension and signs of decreased cardiac output  - Evaluate effectiveness of vasoactive medications to optimize hemodynamic stability  - Monitor arterial and/or venous puncture sites for bleeding and/or hematoma  - Assess quality of pulses, skin color and temperature  - Assess for signs of decreased coronary artery perfusion - ex.  Angina  - Evaluate fluid balance, assess for edema, trend weights  Outcome: Progressing  Goal: Absence of cardiac arrhythmias or at baseline  Description: INTERVENTIONS:  - Continuous cardiac monitoring, monitor vital signs, obtain 12 lead EKG if indicated  - Evaluate effectiveness of antiarrhythmic and heart rate control medications as ordered  - Initiate emergency measures for life threatening arrhythmias  - Monitor electrolytes and administer replacement therapy as ordered  Outcome: Progressing     Problem: SKIN/TISSUE INTEGRITY - ADULT  Goal: Skin integrity remains intact  Description: INTERVENTIONS  - Assess and document risk factors for pressure ulcer development  - Assess and document skin integrity  - Monitor for areas of redness and/or skin breakdown  - Initiate interventions, skin care algorithm/standards of care as needed  Outcome: Progressing     Problem: HEMATOLOGIC - ADULT  Goal: Maintains hematologic stability  Description: INTERVENTIONS  - Assess for signs and symptoms of bleeding or hemorrhage  - Monitor labs and vital signs for trends  - Administer supportive blood products/factors, fluids and medications as ordered and appropriate  - Administer supportive blood products/factors as ordered and appropriate  Outcome: Progressing  Goal: Free from bleeding injury  Description: (Example usage: patient with low platelets)  INTERVENTIONS:  - Avoid intramuscular injections, enemas and rectal medication administration  - Ensure safe mobilization of patient  - Hold pressure on venipuncture sites to achieve adequate hemostasis  - Assess for signs and symptoms of internal bleeding  - Monitor lab trends  - Patient is to report abnormal signs of bleeding to staff  - Avoid use of toothpicks and dental floss  - Use electric shaver for shaving  - Use soft bristle tooth brush  - Limit straining and forceful nose blowing  Outcome: Progressing     Problem: NEUROLOGICAL - ADULT  Goal: Achieves stable or improved neurological status  Description: INTERVENTIONS  - Assess for and report changes in neurological status  - Initiate measures to prevent increased intracranial pressure  - Maintain blood pressure and fluid volume within ordered parameters to optimize cerebral perfusion and minimize risk of hemorrhage  - Monitor temperature, glucose, and sodium.  Initiate appropriate interventions as ordered  Outcome: Progressing     Problem: Impaired Functional Mobility  Goal: Achieve highest/safest level of mobility/gait  Description: Interventions:  - Assess patient's functional ability and stability  - Promote increasing activity/tolerance for mobility and gait  - Educate and engage patient/family in tolerated activity level and precautions    Outcome: Progressing

## 2023-08-28 NOTE — PHYSICAL THERAPY NOTE
PHYSICAL THERAPY EVALUATION - INPATIENT     Room Number: 531/531-A  Evaluation Date: 8/28/2023  Type of Evaluation: Initial  Physician Order: PT Eval and Treat    Presenting Problem: lower GIB  Co-Morbidities : breast cancer s/p left lumpectomy, dementia, PE, SVT, atrial tachycardia, HTN and ischemic and hemorrhagic stroke 7/12/23  Reason for Therapy: Mobility Dysfunction and Discharge Planning    History related to current admission: Patient is a 79year old female admitted on 8/26/2023: Presented from hospice at [facility] with lower GIB and anemia (hgb 8.7 at time of evaluation)    7/12-7/18/23: nontraumatic thalamic hemorrhage, dc'd [facility]. Initially Admitted to [facility] from [facility] for short term rehab at [facility] on 6/22/2023. ASSESSMENT   In this PT evaluation, the patient presents with the following impairments 1) L neglect - crossed midline x10% but unable to maintain, 2) Significant LLE knee extension tone - modified stanford 4, hip extension 4, L elbow flexion tone 3, L finger flexion 3. 3) Impaired sitting balance with R lateral lean  - repositioned as appropriate, 4) Decreased activity tolerance and endurance - utilized total lift for transfer to bedside chair. These impairments and comorbidities manifest themselves as functional limitations in independent bed mobility, transfers, and gait. The patient is below baseline and would benefit from skilled inpatient PT to address the above deficits to assist patient in returning to prior to level of function. Functional outcome measures completed include AMPAC. The AM-PAC '6-Clicks' Inpatient Basic Mobility Short Form was completed and this patient is demonstrating a Approx Degree of Impairment: 86.62%  degree of impairment in mobility. Research supports that patients with this level of impairment may benefit from [facility].     DISCHARGE RECOMMENDATIONS  PT Discharge Recommendations: Sub-acute rehabilitation    PLAN  PT Treatment Plan: Bed mobility; Body mechanics; Coordination; Endurance; Energy conservation;Patient education; Family education;Range of motion; Neuromuscular re-educate;Strengthening;Transfer training;Balance training  Rehab Potential : Good  Frequency (Obs):  (1-2x per week)  Number of Visits to Meet Established Goals: 5      CURRENT GOALS    Goal #1 Patient is able to demonstrate supine - sit EOB @ level: moderate assistance     Goal #2 Patient is able to demonstrate fair (-) static sitting balance EOB ~ 2 minutes. Goal #3      Goal #4    Goal #5    Goal #6    Goal Comments: Goals established on 2023    HOME SITUATION  Type of Home: Independent living facility (has been at [place] [id number])   Home Layout: One level                      Patient Owned Equipment: Rolling walker       Prior Level of Armstrong: prior to previous admission in 2023 pt was at rehab \"documented pt required assist with ADLs, was working with PT on generalized weakness and gait instability, prior to that was in ILF\". Last admit pt required max A X2 to transfer EOB. Since then has required total assist with ADLs, assist with feeding.      SUBJECTIVE  \" I want to get out to the chair\"       OBJECTIVE  Precautions: Bed/chair alarm  Fall Risk: High fall risk    WEIGHT BEARING RESTRICTION  Weight Bearing Restriction: None                PAIN ASSESSMENT  Ratin          COGNITION  Overall Cognitive Status:  Impaired  Following Commands:  follows 1 step commands with increased time and tactile cuing with RUE     RANGE OF MOTION AND STRENGTH ASSESSMENT  Upper extremity ROM and strength are within functional limits on the R, below on the L which remained in decorticate positioning     Lower extremity ROM is not within functional limits Significant LLE knee extension tone - modified stanford 4, hip extension 4    Lower extremity strength is below functional limits B: 2/5 on the R hamstring, quad, hip flexor, unable to assess LLE 2/2 tone as noted above      BALANCE  Static Sitting: Dependent  Dynamic Sitting: Not tested  Static Standing: Not tested  Dynamic Standing: Not tested    ADDITIONAL TESTS                                    ACTIVITY TOLERANCE                         O2 WALK       NEUROLOGICAL FINDINGS                        AM-PAC '6-Clicks' INPATIENT SHORT FORM - BASIC MOBILITY  How much difficulty does the patient currently have. .. Patient Difficulty: Turning over in bed (including adjusting bedclothes, sheets and blankets)?: A Little   Patient Difficulty: Sitting down on and standing up from a chair with arms (e.g., wheelchair, bedside commode, etc.): Unable   Patient Difficulty: Moving from lying on back to sitting on the side of the bed?: Unable   How much help from another person does the patient currently need. .. Help from Another: Moving to and from a bed to a chair (including a wheelchair)?: Total   Help from Another: Need to walk in hospital room?: Total   Help from Another: Climbing 3-5 steps with a railing?: Total       AM-PAC Score:  Raw Score: 8   Approx Degree of Impairment: 86.62%   Standardized Score (AM-PAC Scale): 28.58   CMS Modifier (G-Code): CM    FUNCTIONAL ABILITY STATUS  Gait Assessment   Functional Mobility/Gait Assessment  Gait Assistance: Not tested  Distance (ft): 0    Skilled Therapy Provided     Bed Mobility:  Rolling: mod A B to place total lift      Therapist's Comments: After transfer to bedside chair with total lift (PCT assisting with line management) pt maintaining full B knee extension, therapist able to assist in knee flexion on the R however pt will transition back to extension if not blocked. Unable to achieve any knee flexion on the L. Impaired static sitting balance demonstrated while attempting to achieve hip flexion. BLE elevated in recliner chair . Dep assist for repositioning trunk out of R lateral lean with pillows placed as appropriate. Performed reaching ~45deg with RUE to therapists hand.  Able to obtain midline from R gaze preference while following hand. Able to cross midline while therapist holding pts hand ~10%. Exercise/Education Provided:  Bed mobility  Body mechanics  Neuromuscular re-educate  Posture  ROM    Patient End of Session: Up in chair;Call light within reach; Needs met;RN aware of session/findings; All patient questions and concerns addressed; Alarm set      Patient Evaluation Complexity Level:  History Moderate - 1 or 2 personal factors and/or co-morbidities   Examination of body systems Moderate - addressing a total of 3 or more elements   Clinical Presentation Moderate - Evolving   Clinical Decision Making Moderate - Evolving       PT Session Time: 25 minutes  Gait Training:  minutes  Therapeutic Activity:  minutes  Neuromuscular Re-education: 10 minutes  Therapeutic Exercise:  minutes

## 2023-08-28 NOTE — SLP NOTE
SPEECH DAILY NOTE - INPATIENT    ASSESSMENT & PLAN   ASSESSMENT  Pt seen for dysphagia tx to assess tolerance with recommended diet, ensure appropriate utilization of aspiration precautions and provide pt/family education. Pt found sitting upright in bed with noon tray present in room. Tray contained clear liquid diet. Spoke with RN this am and informed patient approved for advance in diet by GI. Pt assisted with po trials of pureed and thin liquids via spoon & cup. Good tolerance and no overt clinical s/s of aspiration observed. Pt's son entered room and reported pt previously on pureed diet with thin liquids at nursing home prior to admit. Agreed patient safest and should remain on this diet. Reviewed and reiterated aspiration precautions with good understanding. Will continue to follow as per plan. Diet Recommendations - Solids: Puree  Diet Recommendations - Liquids: Thin Liquids    Compensatory Strategies Recommended: Slow rate; Alternate consistencies;Small bites and sips; Extra sauce/gravy  Aspiration Precautions: Upright position  Medication Administration Recommendations: Crushed in puree (if cleared by GI)    Patient Experiencing Pain: No                Discharge Recommendations  Discharge Recommendations/Plan: Sub-acute rehabilitation    Treatment Plan  Treatment Plan/Recommendations: Dysphagia therapy    Interdisciplinary Communication: Discussed with RN             GOALS  Goal #1 The patient will tolerate a clear liquid consistency and thin liquids without overt signs or symptoms of aspiration with 100 % accuracy over 2 session(s). Pt will tolerate a pureed diet with thin liquids without s/s of aspiration with 100% accuracy over 1 session Met        New goal   Goal #2 The patient will utilize compensatory strategies as outlined by  BSSE (clinical evaluation) including Slow rate, Upright 90 degrees with min assistance 90 % of the time across 2 sessions.      In Progress           FOLLOW UP  Follow Up Needed (Documentation Required): Yes  SLP Follow-up Date: 08/29/23  Number of Visits to Meet Established Goals: 1    Session: 2    If you have any questions, please contact LEYDA Saleem MA, [phone] [facility]  Pager

## 2023-08-28 NOTE — PROGRESS NOTES
[facility] Pharm[facility] Note: Route Optimization for Pantoprazole (Protonix)    Patient is currently on Pantoprazole (Protonix) 40 mg IV every 12 hours. The patient meets the criteria to convert to the oral equivalent as established by the IV to Oral conversion protocol approved by the P&T committee. Medication was changed from IV formulation to lansoprazole (Prevacid)  40 mg PO BID per protocol. [facility] Note: Route Optimization for Levetiracetam (KEPPRA)    Patient is currently on Levetiracetam (KEPPRA) 500 mg IV every 12 hours. The patient meets the criteria to convert to the oral equivalent as established by the IV to Oral conversion protocol approved by the P&T committee. Medication was changed from IV formulation to Levetiracetam (KEPPRA)  500 mg PO every 12 hours per protocol.       Ceci Zimmerman, PharmD  8/28/2023,  2:51 PM

## 2023-08-28 NOTE — PLAN OF CARE
Problem: Delirium  Goal: Minimize duration of delirium  Description: Interventions:  - Encourage use of hearing aids, eye glasses  - Promote highest level of mobility daily  - Provide frequent reorientation  - Promote wakefulness i.e. lights on, blinds open  - Promote sleep, encourage patient's normal rest cycle i.e. lights off, TV off, minimize noise and interruptions  - Encourage family to assist in orientation and promotion of home routines  Outcome: Progressing     Problem: Patient/Family Goals  Goal: Patient/Family Long Term Goal  Description: Patient's Long Term Goal: discharge with adequate resources    Interventions:  - follow poc: labs, continuous monitoring, IVF  - See additional Care Plan goals for specific interventions  Outcome: Progressing  Goal: Patient/Family Short Term Goal  Description: Patient's Short Term Goal: 8/27 NOC: no bleeding overnight, sleep well     Interventions:   - continuous monitoring, q12h hgb  -cluster care, dim lighting  - See additional Care Plan goals for specific interventions  Outcome: Progressing     Problem: PAIN - ADULT  Goal: Verbalizes/displays adequate comfort level or patient's stated pain goal  Description: INTERVENTIONS:  - Encourage pt to monitor pain and request assistance  - Assess pain using appropriate pain scale  - Administer analgesics based on type and severity of pain and evaluate response  - Implement non-pharmacological measures as appropriate and evaluate response  - Consider cultural and social influences on pain and pain management  - Manage/alleviate anxiety  - Utilize distraction and/or relaxation techniques  - Monitor for opioid side effects  - Notify MD/LIP if interventions unsuccessful or patient reports new pain  - Anticipate increased pain with activity and pre-medicate as appropriate  Outcome: Progressing     Problem: RISK FOR INFECTION - ADULT  Goal: Absence of fever/infection during anticipated neutropenic period  Description: INTERVENTIONS  - Monitor WBC  - Administer growth factors as ordered  - Implement neutropenic guidelines  Outcome: Progressing     Problem: SAFETY ADULT - FALL  Goal: Free from fall injury  Description: INTERVENTIONS:  - Assess pt frequently for physical needs  - Identify cognitive and physical deficits and behaviors that affect risk of falls.   - [place] fall precautions as indicated by assessment.  - Educate pt/family on patient safety including physical limitations  - Instruct pt to call for assistance with activity based on assessment  - Modify environment to reduce risk of injury  - Provide assistive devices as appropriate  - Consider OT/PT consult to assist with strengthening/mobility  - Encourage toileting schedule  Outcome: Progressing     Problem: DISCHARGE PLANNING  Goal: Discharge to home or other facility with appropriate resources  Description: INTERVENTIONS:  - Identify barriers to discharge w/pt and caregiver  - Include patient/family/discharge partner in discharge planning  - Arrange for needed discharge resources and transportation as appropriate  - Identify discharge learning needs (meds, wound care, etc)  - Arrange for interpreters to assist at discharge as needed  - Consider post-discharge preferences of patient/family/discharge partner  - Complete POLST form as appropriate  - Assess patient's ability to be responsible for managing their own health  - Refer to Case Management Department for coordinating discharge planning if the patient needs post-hospital services based on physician/LIP order or complex needs related to functional status, cognitive ability or social support system  Outcome: Progressing     Problem: CARDIOVASCULAR - ADULT  Goal: Maintains optimal cardiac output and hemodynamic stability  Description: INTERVENTIONS:  - Monitor vital signs, rhythm, and trends  - Monitor for bleeding, hypotension and signs of decreased cardiac output  - Evaluate effectiveness of vasoactive medications to optimize hemodynamic stability  - Monitor arterial and/or venous puncture sites for bleeding and/or hematoma  - Assess quality of pulses, skin color and temperature  - Assess for signs of decreased coronary artery perfusion - ex.  Angina  - Evaluate fluid balance, assess for edema, trend weights  Outcome: Progressing  Goal: Absence of cardiac arrhythmias or at baseline  Description: INTERVENTIONS:  - Continuous cardiac monitoring, monitor vital signs, obtain 12 lead EKG if indicated  - Evaluate effectiveness of antiarrhythmic and heart rate control medications as ordered  - Initiate emergency measures for life threatening arrhythmias  - Monitor electrolytes and administer replacement therapy as ordered  Outcome: Progressing     Problem: SKIN/TISSUE INTEGRITY - ADULT  Goal: Skin integrity remains intact  Description: INTERVENTIONS  - Assess and document risk factors for pressure ulcer development  - Assess and document skin integrity  - Monitor for areas of redness and/or skin breakdown  - Initiate interventions, skin care algorithm/standards of care as needed  Outcome: Progressing     Problem: HEMATOLOGIC - ADULT  Goal: Maintains hematologic stability  Description: INTERVENTIONS  - Assess for signs and symptoms of bleeding or hemorrhage  - Monitor labs and vital signs for trends  - Administer supportive blood products/factors, fluids and medications as ordered and appropriate  - Administer supportive blood products/factors as ordered and appropriate  Outcome: Progressing  Goal: Free from bleeding injury  Description: (Example usage: patient with low platelets)  INTERVENTIONS:  - Avoid intramuscular injections, enemas and rectal medication administration  - Ensure safe mobilization of patient  - Hold pressure on venipuncture sites to achieve adequate hemostasis  - Assess for signs and symptoms of internal bleeding  - Monitor lab trends  - Patient is to report abnormal signs of bleeding to staff  - Avoid use of toothpicks and dental floss  - Use electric shaver for shaving  - Use soft bristle tooth brush  - Limit straining and forceful nose blowing  Outcome: Progressing     Problem: NEUROLOGICAL - ADULT  Goal: Achieves stable or improved neurological status  Description: INTERVENTIONS  - Assess for and report changes in neurological status  - Initiate measures to prevent increased intracranial pressure  - Maintain blood pressure and fluid volume within ordered parameters to optimize cerebral perfusion and minimize risk of hemorrhage  - Monitor temperature, glucose, and sodium.  Initiate appropriate interventions as ordered  Outcome: Progressing     Problem: Impaired Functional Mobility  Goal: Achieve highest/safest level of mobility/gait  Description: Interventions:  - Assess patient's functional ability and stability  - Promote increasing activity/tolerance for mobility and gait  - Educate and engage patient/family in tolerated activity level and precautions    Outcome: Progressing

## 2023-08-30 NOTE — PAYOR COMM NOTE
--------------  DISCHARGE REVIEW    Payor: [employer]  Subscriber #:  [id number]  Authorization Number: [id number]    Admit date: 8/26/23  Admit time:   9:59 PM  Discharge Date: 8/28/2023  6:43 PM     Admitting Physician: Leslie Irwin MD  Attending Physician:  No att. providers found  Primary Care Physician: Liliane Diamond MD       Discharge Summary Notes    No notes of this type exist for this encounter.

## 2023-10-18 ENCOUNTER — TELEPHONE (OUTPATIENT)
Dept: MEDSURG UNIT | Facility: HOSPITAL | Age: 83
End: 2023-10-18